# Patient Record
Sex: FEMALE | Race: WHITE | NOT HISPANIC OR LATINO | Employment: OTHER | URBAN - METROPOLITAN AREA
[De-identification: names, ages, dates, MRNs, and addresses within clinical notes are randomized per-mention and may not be internally consistent; named-entity substitution may affect disease eponyms.]

---

## 2017-05-09 RX ORDER — MIRABEGRON 50 MG/1
TABLET, FILM COATED, EXTENDED RELEASE ORAL
Qty: 90 TABLET | Refills: 0 | Status: SHIPPED | OUTPATIENT
Start: 2017-05-09 | End: 2017-06-27 | Stop reason: SDUPTHER

## 2017-06-26 RX ORDER — MIRABEGRON 50 MG/1
TABLET, FILM COATED, EXTENDED RELEASE ORAL
Qty: 90 TABLET | Refills: 3 | OUTPATIENT
Start: 2017-06-26

## 2017-06-27 ENCOUNTER — OFFICE VISIT (OUTPATIENT)
Dept: OBSTETRICS AND GYNECOLOGY | Facility: CLINIC | Age: 67
End: 2017-06-27

## 2017-06-27 VITALS
HEIGHT: 66 IN | DIASTOLIC BLOOD PRESSURE: 74 MMHG | SYSTOLIC BLOOD PRESSURE: 136 MMHG | WEIGHT: 239 LBS | BODY MASS INDEX: 38.41 KG/M2

## 2017-06-27 DIAGNOSIS — Z13.820 SCREENING FOR OSTEOPOROSIS: ICD-10-CM

## 2017-06-27 DIAGNOSIS — Z79.890 POST-MENOPAUSE ON HRT (HORMONE REPLACEMENT THERAPY): Primary | ICD-10-CM

## 2017-06-27 DIAGNOSIS — N32.81 OAB (OVERACTIVE BLADDER): ICD-10-CM

## 2017-06-27 DIAGNOSIS — K46.9 ENTEROCELE: ICD-10-CM

## 2017-06-27 DIAGNOSIS — Z79.890 HORMONE REPLACEMENT THERAPY: ICD-10-CM

## 2017-06-27 PROCEDURE — G0101 CA SCREEN;PELVIC/BREAST EXAM: HCPCS | Performed by: OBSTETRICS & GYNECOLOGY

## 2017-06-27 RX ORDER — SITAGLIPTIN AND METFORMIN HYDROCHLORIDE 1000; 50 MG/1; MG/1
1 TABLET, FILM COATED, EXTENDED RELEASE ORAL 2 TIMES DAILY
COMMUNITY
Start: 2017-04-22 | End: 2020-07-28

## 2017-06-27 RX ORDER — BLOOD-GLUCOSE METER
EACH MISCELLANEOUS
COMMUNITY
Start: 2017-06-23

## 2017-06-27 RX ORDER — ESTRADIOL 0.5 MG/1
0.5 TABLET ORAL EVERY OTHER DAY
Qty: 90 TABLET | Refills: 3 | Status: SHIPPED | OUTPATIENT
Start: 2017-06-27 | End: 2017-08-07 | Stop reason: SDUPTHER

## 2017-06-27 RX ORDER — BLOOD SUGAR DIAGNOSTIC
STRIP MISCELLANEOUS
COMMUNITY
Start: 2017-04-17 | End: 2021-05-11 | Stop reason: SDUPTHER

## 2017-06-27 NOTE — PROGRESS NOTES
"   Chief Complaint   Patient presents with   • Gynecologic Exam   • Med Refill       Yulissa Bauer is a 66 y.o. year old  presenting to be seen for her annual exam.This patient has previously had a robotically-assisted T LH/BSO/VCS/anterior-posterior repair.  This was done in .  She currently takes estradiol, 0.5 mg daily and has relief of menopausal symptoms.  When she attempted to discontinue this medication she developed hot flashes, night sweats, and skin changes.  She desires to continue and has no contraindications.  He has a diagnosis of overactive bladder treated successfully with Mirbegron, 50 mg daily.  She has some dry mouth.    SCREENING TESTS    Year 2012   Age                         PAP   Neg.                      HPV high risk                         Mammogram     benign                    RADHA score                         Breast MRI                         Lipids                         Vitamin D                         Colonoscopy                         DEXA  Frax (hip/any)                         Ovarian Screen                           Enter the month test was performed.  If month not known, enter \"X'  · Black numbers = normal results  · Red numbers = abnormal results  · Black X = patient reported normal  · Red X - patient reported abnormal      Referred by:    Profession:    Other info:        She exercises regularly: no.  She wears her seat belt: yes.  She has concerns about domestic violence: no.  She has noticed changes in height: no    GYN screening history:  · Last mammogram: was done on approximately 2016 and the result was: Birads II (Benign findings)..    No Additional Complaints Reported    The following portions of the patient's history were reviewed and updated as appropriate:vital signs and   She  does not have any pertinent problems on file.  She  has a past " surgical history that includes Parathyroidectomy; Liposuction; Hysteroscopy; Dilation and curettage of uterus; Pelvic laparoscopy; Tubal ligation; Hysterectomy; and Oophorectomy.  Her family history includes Breast cancer in her mother; Heart attack in her father; Stroke in her father.  She  reports that she has never smoked. She has never used smokeless tobacco. She reports that she drinks alcohol. She reports that she does not use illicit drugs.  Current Outpatient Prescriptions   Medication Sig Dispense Refill   • ACCU-CHEK LUÍS PLUS test strip      • acetaminophen (TYLENOL) 650 MG 8 hr tablet Take 650 mg by mouth every 8 (eight) hours as needed for mild pain (1-3).     • amLODIPine (NORVASC) 5 MG tablet   0   • aspirin 81 MG chewable tablet Chew 81 mg daily.     • atenolol (TENORMIN) 25 MG tablet Take 25 mg by mouth daily.     • Blood Glucose Monitoring Suppl (ACCU-CHEK LUÍS PLUS) W/DEVICE kit      • estradiol (ESTRACE) 0.5 MG tablet Take 1 tablet by mouth Every Other Day. 90 tablet 3   • JANUMET XR  MG tablet sustained-release 24 hour Take 1 tablet by mouth 2 (Two) Times a Day.     • meloxicam (MOBIC) 15 MG tablet Take 15 mg by mouth daily.     • Mirabegron ER (MYRBETRIQ) 50 MG tablet sustained-release 24 hour 24 hr tablet Take 50 mg by mouth Daily. 90 tablet 3   • Multiple Vitamins-Minerals (DAILY MULTI VITAMIN/MINERALS PO) Take  by mouth.       No current facility-administered medications for this visit.      She is allergic to aygestin [norethindrone]; cephalosporins; codeine; hydrocodone; penicillins; and prometrium [progesterone micronized]..    Review of Systems  A comprehensive review of systems was taken.  Constitutional: negative for fever, chills, activity change, appetite change, fatigue and unexpected weight change.  Respiratory: negative  Cardiovascular: negative  Gastrointestinal: negative  Genitourinary:negative  Musculoskeletal:negative  Behavioral/Psych: negative       /74  Ht  "65.5\" (166.4 cm)  Wt 239 lb (108 kg)  LMP  (LMP Unknown)  BMI 39.17 kg/m2    Physical Exam    General:  alert; cooperative; well developed; well nourished   Skin:  No suspicious lesions seen   Thyroid: normal to inspection and palpation   Lungs:  clear to auscultation bilaterally   Heart:  regular rate and rhythm, S1, S2 normal, no murmur, click, rub or gallop   Breasts:  Examined in supine position  Symmetric without masses or skin dimpling  Nipples normal without inversion, lesions or discharge  There are no palpable axillary nodes   Abdomen: soft, non-tender; no masses  no umbilical or inginual hernias are present  no hepato-splenomegaly   Pelvis: Clinical staff was present for exam  External genitalia:  normal appearance of the external genitalia including Barth[place]'s and [place]'s glands.  Vaginal:  normal pink mucosa without prolapse or lesions.  Cervix:  absent.  Uterus:  absent.  Adnexa:  absent, bilateral.  Rectal:  anus visually normal appearing. recto-vaginal exam unremarkable and confirms findings;  Enterocele GRADE 1     Lab Review   No data reviewed    Imaging  Mammogram results- benign         ASSESSMENT  Problems Addressed this Visit        Digestive    Enterocele       Musculoskeletal and Integument    OAB (overactive bladder)    Relevant Medications    Mirabegron ER (MYRBETRIQ) 50 MG tablet sustained-release 24 hour 24 hr tablet       Genitourinary    Post-menopause on HRT (hormone replacement therapy) - Primary    Relevant Medications    estradiol (ESTRACE) 0.5 MG tablet      Other Visit Diagnoses     Hormone replacement therapy        Relevant Medications    estradiol (ESTRACE) 0.5 MG tablet          PLAN    Medications prescribed this encounter:    New Medications Ordered This Visit   Medications   • JANUMET XR  MG tablet sustained-release 24 hour     Sig: Take 1 tablet by mouth 2 (Two) Times a Day.   • Blood Glucose Monitoring Suppl (ACCU-CHEK LUÍS PLUS) W/DEVICE kit   • ACCU-CHEK " LUÍS PLUS test strip   • estradiol (ESTRACE) 0.5 MG tablet     Sig: Take 1 tablet by mouth Every Other Day.     Dispense:  90 tablet     Refill:  3   • Mirabegron ER (MYRBETRIQ) 50 MG tablet sustained-release 24 hour 24 hr tablet     Sig: Take 50 mg by mouth Daily.     Dispense:  90 tablet     Refill:  3   · DEXA ordered  · Cautioned to avoid heavy lifting and straining with bowel movements  · Calcium, 600 mg/ Vit. D, 400 IU daily; regular weight-bearing exercise  · Follow up: 12 month(s)  *Please note that portions of this documentation may have been completed with a voice recognition program.  Efforts were made to edit this dictation, but occasional words may have been mistranscribed.       This note was electronically signed.    FILIPE Narayan MD  June 27, 2017  9:41 AM

## 2017-07-07 DIAGNOSIS — Z79.890 HORMONE REPLACEMENT THERAPY: ICD-10-CM

## 2017-07-07 RX ORDER — ESTRADIOL 0.5 MG/1
TABLET ORAL
Qty: 36 TABLET | Refills: 3 | OUTPATIENT
Start: 2017-07-07

## 2017-07-07 RX ORDER — MIRABEGRON 50 MG/1
TABLET, FILM COATED, EXTENDED RELEASE ORAL
Qty: 90 TABLET | Refills: 0 | OUTPATIENT
Start: 2017-07-07

## 2017-07-18 ENCOUNTER — HOSPITAL ENCOUNTER (OUTPATIENT)
Dept: BONE DENSITY | Facility: HOSPITAL | Age: 67
Discharge: HOME | End: 2017-07-18
Attending: OBSTETRICS & GYNECOLOGY | Admitting: OBSTETRICS & GYNECOLOGY

## 2017-07-18 DIAGNOSIS — Z13.820 SCREENING FOR OSTEOPOROSIS: ICD-10-CM

## 2017-07-18 PROCEDURE — 77080 DXA BONE DENSITY AXIAL: CPT

## 2017-07-18 PROCEDURE — 77080 DXA BONE DENSITY AXIAL: CPT | Performed by: RADIOLOGY

## 2017-08-06 DIAGNOSIS — Z79.890 HORMONE REPLACEMENT THERAPY: ICD-10-CM

## 2017-08-07 RX ORDER — ESTRADIOL 0.5 MG/1
TABLET ORAL
Qty: 36 TABLET | Refills: 3 | Status: SHIPPED | OUTPATIENT
Start: 2017-08-07 | End: 2018-08-31 | Stop reason: SDUPTHER

## 2017-08-07 RX ORDER — MIRABEGRON 50 MG/1
TABLET, FILM COATED, EXTENDED RELEASE ORAL
Qty: 90 TABLET | Refills: 0 | Status: SHIPPED | OUTPATIENT
Start: 2017-08-07 | End: 2017-12-04 | Stop reason: SDUPTHER

## 2017-10-12 ENCOUNTER — TRANSCRIBE ORDERS (OUTPATIENT)
Dept: ADMINISTRATIVE | Facility: HOSPITAL | Age: 67
End: 2017-10-12

## 2017-10-12 DIAGNOSIS — Z12.31 VISIT FOR SCREENING MAMMOGRAM: Primary | ICD-10-CM

## 2017-11-14 ENCOUNTER — HOSPITAL ENCOUNTER (OUTPATIENT)
Dept: MAMMOGRAPHY | Facility: HOSPITAL | Age: 67
Discharge: HOME | End: 2017-11-14
Attending: OBSTETRICS & GYNECOLOGY | Admitting: OBSTETRICS & GYNECOLOGY

## 2017-11-14 DIAGNOSIS — Z12.31 VISIT FOR SCREENING MAMMOGRAM: ICD-10-CM

## 2017-11-14 PROCEDURE — G0202 SCR MAMMO BI INCL CAD: HCPCS

## 2017-11-14 PROCEDURE — G0202 SCR MAMMO BI INCL CAD: HCPCS | Performed by: RADIOLOGY

## 2017-11-14 PROCEDURE — 77063 BREAST TOMOSYNTHESIS BI: CPT | Performed by: RADIOLOGY

## 2017-11-14 PROCEDURE — 77063 BREAST TOMOSYNTHESIS BI: CPT

## 2017-12-04 RX ORDER — MIRABEGRON 50 MG/1
TABLET, FILM COATED, EXTENDED RELEASE ORAL
Qty: 90 TABLET | Refills: 2 | Status: SHIPPED | OUTPATIENT
Start: 2017-12-04 | End: 2017-12-26 | Stop reason: SDUPTHER

## 2017-12-26 RX ORDER — MIRABEGRON 50 MG/1
TABLET, FILM COATED, EXTENDED RELEASE ORAL
Qty: 90 TABLET | Refills: 3 | Status: SHIPPED | OUTPATIENT
Start: 2017-12-26 | End: 2018-07-10 | Stop reason: SDUPTHER

## 2018-01-09 ENCOUNTER — OFFICE VISIT (OUTPATIENT)
Dept: NEUROSURGERY | Facility: CLINIC | Age: 68
End: 2018-01-09

## 2018-01-09 VITALS
HEIGHT: 66 IN | TEMPERATURE: 97.3 F | DIASTOLIC BLOOD PRESSURE: 90 MMHG | SYSTOLIC BLOOD PRESSURE: 135 MMHG | WEIGHT: 240.2 LBS | BODY MASS INDEX: 38.6 KG/M2

## 2018-01-09 DIAGNOSIS — G50.0 TRIGEMINAL NEURALGIA: Primary | ICD-10-CM

## 2018-01-09 PROCEDURE — 99203 OFFICE O/P NEW LOW 30 MIN: CPT | Performed by: NEUROLOGICAL SURGERY

## 2018-01-09 NOTE — PROGRESS NOTES
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  She is had this for approximately 2-3 months.  2 diabetes but no evidence of neuralgia otherwise.  She has is unassociated with headache or any other neurological manifestation.  Her health has been excellent.. ]    Past Medical History:   Diagnosis Date   • Carcinoma in situ of skin of chest wall    • Diabetes mellitus     TYPE 2   • Hypertension    • Inverse psoriasis    • Menopause    • Rectocele    • Urine incontinence    • Uterine prolapse        Past Surgical History:   Procedure Laterality Date   • DILATATION AND CURETTAGE     • HYSTERECTOMY     • HYSTEROSCOPY      D&C X2   • LIPOSUCTION     • OOPHORECTOMY     • PARATHYROIDECTOMY     • PELVIC LAPAROSCOPY     • TUBAL ABDOMINAL LIGATION         Family History   Problem Relation Age of Onset   • Stroke Father    • Heart attack Father    • Breast cancer Mother 55   • Ovarian cancer Neg Hx        Social History     Social History   • Marital status:      Spouse name: N/A   • Number of children: N/A   • Years of education: N/A     Occupational History   • Not on file.     Social History Main Topics   • Smoking status: Never Smoker   • Smokeless tobacco: Never Used   • Alcohol use Yes      Comment: RARELY   • Drug use: No   • Sexual activity: No     Other Topics Concern   • Not on file     Social History Narrative       Allergies   Allergen Reactions   • Aygestin [Norethindrone]    • Cephalosporins    • Codeine    • Hydrocodone    • Penicillins    • Prometrium [Progesterone Micronized]          Current Outpatient Prescriptions:   •  ACCU-CHEK LUÍS PLUS test strip, , Disp: , Rfl:   •  acetaminophen  (TYLENOL) 650 MG 8 hr tablet, Take 650 mg by mouth every 8 (eight) hours as needed for mild pain (1-3)., Disp: , Rfl:   •  amLODIPine (NORVASC) 5 MG tablet, , Disp: , Rfl: 0  •  aspirin 81 MG chewable tablet, Chew 81 mg daily., Disp: , Rfl:   •  atenolol (TENORMIN) 25 MG tablet, Take 25 mg by mouth daily., Disp: , Rfl:   •  Blood Glucose Monitoring Suppl (ACCU-CHEK LUÍS PLUS) W/DEVICE kit, , Disp: , Rfl:   •  estradiol (ESTRACE) 0.5 MG tablet, take 1 tablet by mouth every other day, Disp: 36 tablet, Rfl: 3  •  JANUMET XR  MG tablet sustained-release 24 hour, Take 1 tablet by mouth 2 (Two) Times a Day., Disp: , Rfl:   •  meloxicam (MOBIC) 15 MG tablet, Take 15 mg by mouth daily., Disp: , Rfl:   •  Multiple Vitamins-Minerals (DAILY MULTI VITAMIN/MINERALS PO), Take  by mouth., Disp: , Rfl:   •  MYRBETRIQ 50 MG tablet sustained-release 24 hour 24 hr tablet, TAKE 1 TABLET (50MG) EVERY DAY, Disp: 90 tablet, Rfl: 3    Review of Systems   Constitutional: Negative for activity change, appetite change, chills, diaphoresis, fatigue, fever and unexpected weight change.   HENT: Positive for drooling. Negative for congestion, dental problem, ear discharge, ear pain, facial swelling, hearing loss, mouth sores, nosebleeds, postnasal drip, rhinorrhea, sinus pressure, sneezing, sore throat, tinnitus, trouble swallowing and voice change.    Eyes: Negative for photophobia, pain, discharge, redness, itching and visual disturbance.   Respiratory: Negative for apnea, cough, choking, chest tightness, shortness of breath, wheezing and stridor.    Cardiovascular: Negative for chest pain, palpitations and leg swelling.   Gastrointestinal: Negative for abdominal distention, abdominal pain, anal bleeding, blood in stool, constipation, diarrhea, nausea, rectal pain and vomiting.   Endocrine: Negative for cold intolerance, heat intolerance, polydipsia, polyphagia and polyuria.   Genitourinary: Negative for decreased urine volume,  "difficulty urinating, dysuria, enuresis, flank pain, frequency, genital sores, hematuria and urgency.   Musculoskeletal: Negative for arthralgias, back pain, gait problem, joint swelling, myalgias, neck pain and neck stiffness.   Skin: Negative for color change, pallor, rash and wound.   Allergic/Immunologic: Negative for environmental allergies, food allergies and immunocompromised state.   Neurological: Positive for numbness. Negative for dizziness, tremors, seizures, syncope, facial asymmetry, speech difficulty, weakness, light-headedness and headaches.   Hematological: Negative for adenopathy. Does not bruise/bleed easily.   Psychiatric/Behavioral: Negative for agitation, behavioral problems, confusion, decreased concentration, dysphoric mood, hallucinations, self-injury, sleep disturbance and suicidal ideas. The patient is not nervous/anxious and is not hyperactive.        Vitals:    01/09/18 1323   BP: 135/90   Temp: 97.3 °F (36.3 °C)   Weight: 109 kg (240 lb 3.2 oz)   Height: 166.4 cm (65.51\")       Neurological Examination:    Mental status/speech: The patient is alert and oriented.  Speech is clear without aphysia or dysarthria.  No overt cognitive deficits.    Cranial nerve examination:    Olfaction: Smell is intact.  Vision: Vision is intact without visual field abnormalities.  Funduscopic examination is normal.  No pupillary irregularity.  Ocular motor examination: The extraocular muscles are intact.  There is no diplopia.  The pupil is round and reactive to both light and accommodation.  There is no nystagmus.  Facial movement/sensation: There is no facial weakness.  Sensation is intact in the first, second, and third divisions of the trigeminal nerve.  The corneal reflex is intact.  Auditory: Hearing is intact to finger rub bilaterally.  Cranial nerves IX, X, XI, XII: Phonation is normal.  No dysphagia.  Tongue is protruded in the midline without atrophy.  The gag reflex is intact.  Shoulder shrug is " normal.    Musculoligamentous ligamentous examination: There is no weakness sensory loss or reflex asymmetry.  Gait is normal.  No Babinski Ammy or clonus.    Medical Decision Making:     Diagnostic Data Set:  [  none   ]      Assessment:  [  Trigeminal neuralgia ]          Recommendations:  [ I have ordered a MRI of the brain and was see her subsequently.   ]        I greatly appreciate the opportunity to see and evaluate this individual.  If you have questions or concerns regarding issues that I may have overlooked please call me at any time: [phone].  Randy Giron M.D.  [facility]  [address] [place] [address]    Scribed for Scott Giron MD by Dustin Powell CMA. 1/9/2018  1:49 PM     I have read and concur with the information provided by the scribe.  Scott Giron MD

## 2018-01-16 ENCOUNTER — HOSPITAL ENCOUNTER (OUTPATIENT)
Dept: MRI IMAGING | Facility: HOSPITAL | Age: 68
Discharge: HOME | End: 2018-01-16
Attending: NEUROLOGICAL SURGERY | Admitting: NEUROLOGICAL SURGERY

## 2018-01-16 ENCOUNTER — OFFICE VISIT (OUTPATIENT)
Dept: NEUROSURGERY | Facility: CLINIC | Age: 68
End: 2018-01-16

## 2018-01-16 VITALS
BODY MASS INDEX: 38.83 KG/M2 | HEIGHT: 66 IN | WEIGHT: 241.6 LBS | DIASTOLIC BLOOD PRESSURE: 70 MMHG | SYSTOLIC BLOOD PRESSURE: 110 MMHG | TEMPERATURE: 97.5 F

## 2018-01-16 DIAGNOSIS — R20.0 FACIAL NUMBNESS: Primary | ICD-10-CM

## 2018-01-16 PROCEDURE — A9577 INJ MULTIHANCE: HCPCS | Performed by: NEUROLOGICAL SURGERY

## 2018-01-16 PROCEDURE — 82565 ASSAY OF CREATININE: CPT

## 2018-01-16 PROCEDURE — 0 GADOBENATE DIMEGLUMINE 529 MG/ML SOLUTION: Performed by: NEUROLOGICAL SURGERY

## 2018-01-16 PROCEDURE — 70553 MRI BRAIN STEM W/O & W/DYE: CPT

## 2018-01-16 PROCEDURE — 99213 OFFICE O/P EST LOW 20 MIN: CPT | Performed by: NEUROLOGICAL SURGERY

## 2018-01-16 RX ADMIN — GADOBENATE DIMEGLUMINE 20 ML: 529 INJECTION, SOLUTION INTRAVENOUS at 08:35

## 2018-01-16 NOTE — PROGRESS NOTES
Yulissa Bauer  1950  [id number]                       CURRENT WORKING DIAGNOSIS:   Trigeminal neuralgia         MEDICAL HISTORY SINCE LAST ENCOUNTER:   Reports for evaluation and discussion of MRI of the brain which is essentially within normal limits.  There are a few hypertensive changes in the right hemisphere but certainly no large plaques that would suggest multiple sclerosis or evidence of ischemia.  There is no evidence of neoplasia.           Past Medical History:   Diagnosis Date   • Carcinoma in situ of skin of chest wall    • Diabetes mellitus     TYPE 2   • Hypertension    • Inverse psoriasis    • Menopause    • Rectocele    • Urine incontinence    • Uterine prolapse               Past Surgical History:   Procedure Laterality Date   • DILATATION AND CURETTAGE     • HYSTERECTOMY     • HYSTEROSCOPY      D&C X2   • LIPOSUCTION     • OOPHORECTOMY     • PARATHYROIDECTOMY     • PELVIC LAPAROSCOPY     • TUBAL ABDOMINAL LIGATION                Family History   Problem Relation Age of Onset   • Stroke Father    • Heart attack Father    • Breast cancer Mother 55   • Ovarian cancer Neg Hx               Social History     Social History   • Marital status:      Spouse name: N/A   • Number of children: N/A   • Years of education: N/A     Occupational History   • Not on file.     Social History Main Topics   • Smoking status: Never Smoker   • Smokeless tobacco: Never Used   • Alcohol use Yes      Comment: RARELY   • Drug use: No   • Sexual activity: No     Other Topics Concern   • Not on file     Social History Narrative              Allergies   Allergen Reactions   • Aygestin [Norethindrone]    • Cephalosporins    • Codeine    • Hydrocodone    • Penicillins    • Prometrium [Progesterone Micronized]               Current Outpatient Prescriptions:   •  ACCU-CHEK LUÍS PLUS test strip, , Disp: , Rfl:   •  acetaminophen (TYLENOL) 650 MG 8 hr tablet, Take 650 mg by mouth every 8 (eight) hours as needed for  mild pain (1-3)., Disp: , Rfl:   •  amLODIPine (NORVASC) 5 MG tablet, , Disp: , Rfl: 0  •  aspirin 81 MG chewable tablet, Chew 81 mg daily., Disp: , Rfl:   •  atenolol (TENORMIN) 25 MG tablet, Take 25 mg by mouth daily., Disp: , Rfl:   •  Blood Glucose Monitoring Suppl (ACCU-CHEK LUÍS PLUS) W/DEVICE kit, , Disp: , Rfl:   •  estradiol (ESTRACE) 0.5 MG tablet, take 1 tablet by mouth every other day, Disp: 36 tablet, Rfl: 3  •  JANUMET XR  MG tablet sustained-release 24 hour, Take 1 tablet by mouth 2 (Two) Times a Day., Disp: , Rfl:   •  meloxicam (MOBIC) 15 MG tablet, Take 15 mg by mouth daily., Disp: , Rfl:   •  Multiple Vitamins-Minerals (DAILY MULTI VITAMIN/MINERALS PO), Take  by mouth., Disp: , Rfl:   •  MYRBETRIQ 50 MG tablet sustained-release 24 hour 24 hr tablet, TAKE 1 TABLET (50MG) EVERY DAY, Disp: 90 tablet, Rfl: 3  No current facility-administered medications for this visit.          Review of Systems   Constitutional: Negative for activity change, appetite change, chills, diaphoresis, fatigue, fever and unexpected weight change.   HENT: Positive for drooling. Negative for congestion, dental problem, ear discharge, ear pain, facial swelling, hearing loss, mouth sores, nosebleeds, postnasal drip, rhinorrhea, sinus pressure, sneezing, sore throat, tinnitus, trouble swallowing and voice change.    Eyes: Negative for photophobia, pain, discharge, redness, itching and visual disturbance.   Respiratory: Negative for apnea, cough, choking, chest tightness, shortness of breath, wheezing and stridor.    Cardiovascular: Negative for chest pain, palpitations and leg swelling.   Gastrointestinal: Negative for abdominal distention, abdominal pain, anal bleeding, blood in stool, constipation, diarrhea, nausea, rectal pain and vomiting.   Endocrine: Negative for cold intolerance, heat intolerance, polydipsia, polyphagia and polyuria.   Genitourinary: Negative for decreased urine volume, difficulty urinating,  "dysuria, enuresis, flank pain, frequency, genital sores, hematuria and urgency.   Musculoskeletal: Negative for arthralgias, back pain, gait problem, joint swelling, myalgias, neck pain and neck stiffness.   Skin: Negative for color change, pallor, rash and wound.   Allergic/Immunologic: Negative for environmental allergies, food allergies and immunocompromised state.   Neurological: Positive for numbness. Negative for dizziness, tremors, seizures, syncope, facial asymmetry, speech difficulty, weakness, light-headedness and headaches.   Hematological: Negative for adenopathy. Does not bruise/bleed easily.   Psychiatric/Behavioral: Negative for agitation, behavioral problems, confusion, decreased concentration, dysphoric mood, hallucinations, self-injury, sleep disturbance and suicidal ideas. The patient is not nervous/anxious and is not hyperactive.                Vitals:    01/16/18 0916   BP: 110/70   Temp: 97.5 °F (36.4 °C)   Weight: 110 kg (241 lb 9.6 oz)   Height: 166.4 cm (65.51\")               EXAMINATION: [Without change. ]            MEDICAL DECISION MAKING: [ Her clinical diagnosis is that of neurogenic claudication.  The etiology for this is somewhat idiopathic and none noted to's time.]           ASSESSMENT/DISPOSITION: [ I've explained to her that although she has trigeminal neuralgia there is no precise diagnosis forthcoming.  For that reason I told her to continue to monitor her symptoms and if they worsen or change I would be more than happy to reevaluate her any time.  If her symptoms worsen and these can be managed medically prior to any consideration of microvascular decompression.  Thank you for allowing me to see her]              I APPRECIATE THE OPPORTUNITY OF THIS REFERRAL. PLEASE CALL IF ANY QUESTIONS [phone]    Scribed for Scott Giron MD by Dustin Powell CMA. 1/16/2018  9:30 AM     I have read and concur with the information provided by the scribe.  Scott Giron MD    "

## 2018-01-19 LAB — CREAT BLDA-MCNC: 0.7 MG/DL (ref 0.6–1.3)

## 2018-07-10 ENCOUNTER — OFFICE VISIT (OUTPATIENT)
Dept: OBSTETRICS AND GYNECOLOGY | Facility: CLINIC | Age: 68
End: 2018-07-10

## 2018-07-10 VITALS
HEIGHT: 65 IN | DIASTOLIC BLOOD PRESSURE: 80 MMHG | WEIGHT: 231.2 LBS | BODY MASS INDEX: 38.52 KG/M2 | SYSTOLIC BLOOD PRESSURE: 120 MMHG

## 2018-07-10 DIAGNOSIS — K46.9 ENTEROCELE: ICD-10-CM

## 2018-07-10 DIAGNOSIS — N81.6 RECTOCELE: ICD-10-CM

## 2018-07-10 DIAGNOSIS — Z79.890 POST-MENOPAUSE ON HRT (HORMONE REPLACEMENT THERAPY): Primary | ICD-10-CM

## 2018-07-10 DIAGNOSIS — Z01.419 ENCOUNTER FOR GYNECOLOGICAL EXAMINATION WITHOUT ABNORMAL FINDING: ICD-10-CM

## 2018-07-10 DIAGNOSIS — N32.81 OAB (OVERACTIVE BLADDER): ICD-10-CM

## 2018-07-10 DIAGNOSIS — N81.5 VAGINAL ENTEROCELE: Primary | ICD-10-CM

## 2018-07-10 PROCEDURE — G0101 CA SCREEN;PELVIC/BREAST EXAM: HCPCS | Performed by: OBSTETRICS & GYNECOLOGY

## 2018-07-10 NOTE — PROGRESS NOTES
Chief Complaint   Patient presents with   • Gynecologic Exam     ? rectocele   • Med Refill       Yulissa Bauer is a 67 y.o. year old  presenting to be seen for her annual exam.This patient has previously had a robotically-assisted TLH/BSO/VCS.  She has developed complaints of vaginal pressure and bulging from the vagina.  She has intermittent constipation treated with Colace.  She has symptoms of overactive bladder successfully treated with Myrbetriq, 50 mg daily.  She takes estradiol, 0.5 mg by mouth 3 times a week to relieve dry skin.  She has no side effects on these medications.  She desires evaluation of a potential rectocele/enterocele.    SCREENING TESTS    Year 2012   Age                         PAP                         HPV high risk                         Mammogram     benign benign                   RADHA score                         Breast MRI                         Lipids                         Vitamin D                         Colonoscopy                         DEXA  Frax (hip/any)      Normal                   Ovarian Screen                             She exercises regularly: no.  She wears her seat belt: yes.  She has concerns about domestic violence: no.  She has noticed changes in height: no    GYN screening history:  · Last mammogram: was done on approximately 2017 and the result was: Birads I (Normal).  · Last DEXA: was done on approximately 2017 and the results were: normal.    No Additional Complaints Reported    The following portions of the patient's history were reviewed and updated as appropriate:vital signs and   She  has a past medical history of Carcinoma in situ of skin of chest wall; Diabetes mellitus (CMS/HCC); Hypertension; Inverse psoriasis; Menopause; Rectocele; Urine incontinence; and Uterine prolapse.  She  does not have any pertinent problems on  file.  She  has a past surgical history that includes Parathyroidectomy; Liposuction; Hysteroscopy; Dilation and curettage of uterus; Pelvic laparoscopy; Tubal ligation; total laparoscopic hysterectomy salpingo oophorectomy (Bilateral); and Colporrhaphy.  Her family history includes Breast cancer (age of onset: 55) in her mother; Heart attack in her father; Stroke in her father.  She  reports that she has never smoked. She has never used smokeless tobacco. She reports that she drinks alcohol. She reports that she does not use drugs.  Current Outpatient Prescriptions   Medication Sig Dispense Refill   • ACCU-CHEK LUÍS PLUS test strip      • acetaminophen (TYLENOL) 650 MG 8 hr tablet Take 650 mg by mouth every 8 (eight) hours as needed for mild pain (1-3).     • amLODIPine (NORVASC) 5 MG tablet   0   • aspirin 81 MG chewable tablet Chew 81 mg daily.     • atenolol (TENORMIN) 25 MG tablet Take 25 mg by mouth daily.     • Blood Glucose Monitoring Suppl (ACCU-CHEK LUÍS PLUS) W/DEVICE kit      • estradiol (ESTRACE) 0.5 MG tablet take 1 tablet by mouth every other day 36 tablet 3   • JANUMET XR  MG tablet sustained-release 24 hour Take 1 tablet by mouth 2 (Two) Times a Day.     • meloxicam (MOBIC) 15 MG tablet Take 15 mg by mouth daily.     • Mirabegron ER (MYRBETRIQ) 50 MG tablet sustained-release 24 hour 24 hr tablet Take one tablet by mouth daily 90 tablet 3   • Multiple Vitamins-Minerals (DAILY MULTI VITAMIN/MINERALS PO) Take  by mouth.       No current facility-administered medications for this visit.      She is allergic to aygestin [norethindrone]; cephalosporins; codeine; penicillins; prometrium [progesterone micronized]; cephalexin; ciprofloxacin; hydrocodone; and nitrofurantoin macrocrystal..    Review of Systems  A comprehensive review of systems was taken.  Constitutional: negative for fever, chills, activity change, appetite change, fatigue and unexpected weight change.  Respiratory:  "negative  Cardiovascular: negative  Gastrointestinal: negative  Genitourinary:vaginal pressure  Musculoskeletal:negative  Behavioral/Psych: negative       /80   Ht 165.1 cm (65\")   Wt 105 kg (231 lb 3.2 oz)   LMP  (LMP Unknown)   BMI 38.47 kg/m²     Physical Exam    General:  alert; cooperative; well developed; well nourished  obese - Body mass index is 38.47 kg/m².   Skin:  No suspicious lesions seen   Thyroid: normal to inspection and palpation   Lungs:  clear to auscultation bilaterally   Heart:  regular rate and rhythm, S1, S2 normal, no murmur, click, rub or gallop   Breasts:  Examined in supine position  Symmetric without masses or skin dimpling  Nipples normal without inversion, lesions or discharge  There are no palpable axillary nodes   Abdomen: soft, non-tender; no masses  no umbilical or inginual hernias are present  no hepato-splenomegaly   Pelvis: Clinical staff was present for exam  External genitalia:  normal appearance of the external genitalia including Bartholin's and [place]'s glands.  Vaginal:  normal pink mucosa without prolapse or lesions.  Cervix:  absent.  Uterus:  absent.  Adnexa:  absent, bilateral.  Rectal:  anus visually normal appearing. recto-vaginal exam unremarkable and confirms findings;  Rectocele GRADE 2  Enterocele GRADE 2     Lab Review   No data reviewed    Imaging  Mammogram results- benign, and DEXA- normal         ASSESSMENT  Problems Addressed this Visit        Digestive    Enterocele    Rectocele       Musculoskeletal and Integument    OAB (overactive bladder)    Relevant Medications    Mirabegron ER (MYRBETRIQ) 50 MG tablet sustained-release 24 hour 24 hr tablet       Genitourinary    Post-menopause on HRT (hormone replacement therapy) - Primary      Other Visit Diagnoses     Encounter for gynecological examination without abnormal finding              PLAN    Medications prescribed this encounter:    New Medications Ordered This Visit   Medications   • Mirabegron " ER (MYRBETRIQ) 50 MG tablet sustained-release 24 hour 24 hr tablet     Sig: Take one tablet by mouth daily     Dispense:  90 tablet     Refill:  3   · Monthly self breast assessment and annual breast imaging  · Continue oral estradiol, 0.5 mg 3 times a week  · The patient has been given information about posterior repair/enterocele repair and perineoplasty.  The surgical risks of bowel injury, rectal injury, bleeding, infection, and anesthetic risks were explained.  · Calcium, 600 mg/ Vit. D, 400 IU daily; regular weight-bearing exercise  · Follow up: 5 month(s) for surgery  *Please note that portions of this documentation may have been completed with a voice recognition program.  Efforts were made to edit this dictation, but occasional words may have been mistranscribed.       This note was electronically signed.    FILIPE Narayan MD  July 10, 2018  9:41 AM

## 2018-07-10 NOTE — PATIENT INSTRUCTIONS
Anterior and Posterior Colporrhaphy, Care After  Refer to this sheet in the next few weeks. These instructions provide you with information on caring for yourself after your procedure. Your health care provider may also give you more specific instructions. Your treatment has been planned according to current medical practices, but problems sometimes occur. Call your health care provider if you have any problems or questions after your procedure.  Follow these instructions at home:  · Take frequent rest periods throughout the day.  · Only take over-the-counter or prescription medicines as directed by your health care provider.  · Avoid strenuous activity such as heavy lifting (more than 10 pounds [4.5 kg]), pushing, and pulling until your health care provider says it is okay.  · Take showers if your health care provider approves. Pat incisions dry. Do not rub incisions with a washcloth or towel. Do not take tub baths until your health care provider approves.  · Wear compression stockings as directed by your health care provider. These stockings help prevent blood clots from forming in your legs.  · Talk with your health care provider about when you may return to work and your exercise routine.  · Do not drive until your health care provider approves.  · You may resume your normal diet. Eat a well-balanced diet.  · Drink enough fluids to keep your urine clear or pale yellow.  · Your normal bowel function should return. If you become constipated, you may:  ? Take a mild laxative.  ? Add fruit and bran to your diet.  ? Drink more fluids.  · Do not have sexual intercourse until permitted by your health care provider.  · Follow up with your health care provider as directed.  Contact a health care provider if:  You have persistent nausea or vomiting.  Get help right away if:  · You have increased bleeding (more than a small spot) from the vaginal area.  · Your pain is not relieved with medicine or becomes worse.  · You  have redness, swelling, or increasing pain in the vaginal area.  · You have abdominal pain.  · You see pus coming from the wounds.  · You develop a fever.  · You have a foul smell coming from your vaginal area.  · You develop light-headedness or you feel faint.  · You have difficulty breathing.  This information is not intended to replace advice given to you by your health care provider. Make sure you discuss any questions you have with your health care provider.  Document Released: 07/06/2006 Document Revised: 05/25/2017 Document Reviewed: 05/09/2014  Elsevier Interactive Patient Education © 2017 [employer]

## 2018-08-31 DIAGNOSIS — Z79.890 HORMONE REPLACEMENT THERAPY: ICD-10-CM

## 2018-09-04 RX ORDER — ESTRADIOL 0.5 MG/1
TABLET ORAL
Qty: 36 TABLET | Refills: 1 | Status: SHIPPED | OUTPATIENT
Start: 2018-09-04 | End: 2020-07-28 | Stop reason: SDUPTHER

## 2018-10-22 ENCOUNTER — TELEPHONE (OUTPATIENT)
Dept: OBSTETRICS AND GYNECOLOGY | Facility: CLINIC | Age: 68
End: 2018-10-22

## 2018-10-22 NOTE — TELEPHONE ENCOUNTER
Nette calls today to discuss potentially cancelling her surgery scheduled for December 14.  She has a grade 2 rectocele and a grade 2 enterocele, and is scheduled for a posterior repair/enterocele repair/perineoplasty.  She states that she has a problem with chronic constipation, and has made dietary changes and has been taking stool softeners and an occasional laxative.  She says that due to these changes, her constipation has been resolved and she feels that the bulging has improved.    I advised her to leave her surgery on the schedule at this time and to see how the next few weeks go.  This is an elective procedure and she may choose to call and cancel, but was advised that if she cancelled and then changed her mind, the surgery date would likely be sometime after the first of the year.  She voiced understanding and will wait 2 weeks before making a final decision.

## 2018-10-29 ENCOUNTER — TELEPHONE (OUTPATIENT)
Dept: OBSTETRICS AND GYNECOLOGY | Facility: CLINIC | Age: 68
End: 2018-10-29

## 2018-10-29 NOTE — TELEPHONE ENCOUNTER
Pt calling to say she has decided to cancel her Posterior repair scheduled 12/14. Says she has made changes to her diet and feels the problem with constipation is much improved and she is having much less pressure and feeling of rectal bulging. She understands that she may call back to re-schedule but we can not guarantee availability of the date.

## 2019-01-02 ENCOUNTER — TRANSCRIBE ORDERS (OUTPATIENT)
Dept: ADMINISTRATIVE | Facility: HOSPITAL | Age: 69
End: 2019-01-02

## 2019-01-02 DIAGNOSIS — Z12.31 VISIT FOR SCREENING MAMMOGRAM: Primary | ICD-10-CM

## 2019-02-12 ENCOUNTER — HOSPITAL ENCOUNTER (OUTPATIENT)
Dept: MAMMOGRAPHY | Facility: HOSPITAL | Age: 69
Discharge: HOME | End: 2019-02-12
Attending: OBSTETRICS & GYNECOLOGY | Admitting: OBSTETRICS & GYNECOLOGY

## 2019-02-12 DIAGNOSIS — Z12.31 VISIT FOR SCREENING MAMMOGRAM: ICD-10-CM

## 2019-02-12 PROCEDURE — 77067 SCR MAMMO BI INCL CAD: CPT

## 2019-02-12 PROCEDURE — 77067 SCR MAMMO BI INCL CAD: CPT | Performed by: RADIOLOGY

## 2019-02-12 PROCEDURE — 77063 BREAST TOMOSYNTHESIS BI: CPT | Performed by: RADIOLOGY

## 2019-02-12 PROCEDURE — 77063 BREAST TOMOSYNTHESIS BI: CPT

## 2019-07-23 ENCOUNTER — OFFICE VISIT (OUTPATIENT)
Dept: OBSTETRICS AND GYNECOLOGY | Facility: CLINIC | Age: 69
End: 2019-07-23

## 2019-07-23 VITALS
HEIGHT: 65 IN | DIASTOLIC BLOOD PRESSURE: 76 MMHG | WEIGHT: 227.2 LBS | SYSTOLIC BLOOD PRESSURE: 140 MMHG | BODY MASS INDEX: 37.85 KG/M2

## 2019-07-23 DIAGNOSIS — Z01.419 ENCOUNTER FOR GYNECOLOGICAL EXAMINATION WITHOUT ABNORMAL FINDING: ICD-10-CM

## 2019-07-23 DIAGNOSIS — N81.6 RECTOCELE: ICD-10-CM

## 2019-07-23 DIAGNOSIS — Z79.890 POST-MENOPAUSE ON HRT (HORMONE REPLACEMENT THERAPY): Primary | ICD-10-CM

## 2019-07-23 DIAGNOSIS — K46.9 ENTEROCELE: ICD-10-CM

## 2019-07-23 DIAGNOSIS — N32.81 OAB (OVERACTIVE BLADDER): ICD-10-CM

## 2019-07-23 PROCEDURE — G0101 CA SCREEN;PELVIC/BREAST EXAM: HCPCS | Performed by: OBSTETRICS & GYNECOLOGY

## 2019-07-23 RX ORDER — GABAPENTIN 100 MG/1
300 CAPSULE ORAL AS NEEDED
Refills: 2 | COMMUNITY
Start: 2019-06-19 | End: 2022-03-01

## 2019-07-23 RX ORDER — EMPAGLIFLOZIN 25 MG/1
1 TABLET, FILM COATED ORAL DAILY
COMMUNITY
Start: 2019-05-08 | End: 2021-05-11

## 2019-07-23 NOTE — PROGRESS NOTES
Chief Complaint   Patient presents with   • Gynecologic Exam   • Med Refill   • Urinary Incontinence       Yulissa Bauer is a 68 y.o. year old  presenting to be seen for her annual exam.  Nicholas has previously had tubal sterilization and has had a robotically-assisted TLH/BSO/VCS.  She has complaints of overactive bladder with frequency and urgency.  She has had a partial response to Myrbetriq, 50 mg each evening.  She desires an increase in dosage of possible?  She takes a very small dose of estradiol, 0.25 mg 3 times a week.  She has no side effects on this medication.  She denies bowel symptoms.    SCREENING TESTS    Year 2012   Age                         PAP                         HPV high risk                         Mammogram       benign benign                 RADHA score                         Breast MRI                         Lipids                         Vitamin D                         Colonoscopy                         DEXA  Frax (hip/any)      normal                   Ovarian Screen                             She exercises regularly: yes.  She wears her seat belt: yes.  She has concerns about domestic violence: no.  She has noticed changes in height: no    GYN screening history:  · Last mammogram: was done on approximately 2019 and the result was: Birads II (Benign findings).  · Last DEXA: was done on approximately 2017 and the results were: normal.    No Additional Complaints Reported    The following portions of the patient's history were reviewed and updated as appropriate:vital signs and   She  has a past medical history of Carcinoma in situ of skin of chest wall, Diabetes mellitus (CMS/HCC), Hypertension, Inverse psoriasis, Menopause, Rectocele, Sciatica, Urine incontinence, and Uterine prolapse.  She does not have any pertinent problems on file.  She  has a past  surgical history that includes Parathyroidectomy; Liposuction; Hysteroscopy; Dilation and curettage of uterus; Pelvic laparoscopy; Tubal ligation; total laparoscopic hysterectomy salpingo oophorectomy (Bilateral); Colporrhaphy; Oophorectomy; and Breast cyst aspiration.  Her family history includes Breast cancer (age of onset: 55) in her mother; Heart attack in her father; Stroke in her father.  She  reports that she has never smoked. She has never used smokeless tobacco. She reports that she drinks alcohol. She reports that she does not use drugs.  Current Outpatient Medications   Medication Sig Dispense Refill   • ACCU-CHEK LUÍS PLUS test strip      • acetaminophen (TYLENOL) 650 MG 8 hr tablet Take 650 mg by mouth every 8 (eight) hours as needed for mild pain (1-3).     • amLODIPine (NORVASC) 5 MG tablet   0   • aspirin 81 MG chewable tablet Chew 81 mg daily.     • atenolol (TENORMIN) 25 MG tablet Take 25 mg by mouth daily.     • Blood Glucose Monitoring Suppl (ACCU-CHEK LUÍS PLUS) W/DEVICE kit      • estradiol (ESTRACE) 0.5 MG tablet take 1 tablet by mouth every other day 36 tablet 1   • gabapentin (NEURONTIN) 100 MG capsule Take 1 capsule by mouth As Needed.  2   • JANUMET XR  MG tablet sustained-release 24 hour Take 1 tablet by mouth 2 (Two) Times a Day.     • JARDIANCE 25 MG tablet Take 0.5 tablets by mouth Daily.     • meloxicam (MOBIC) 15 MG tablet Take 15 mg by mouth daily.     • Mirabegron ER (MYRBETRIQ) 25 MG tablet sustained-release 24 hour 24 hr tablet Take one tablet by mouth in the morning 90 tablet 3   • Mirabegron ER (MYRBETRIQ) 50 MG tablet sustained-release 24 hour 24 hr tablet Take one tablet by mouth in the evening 90 tablet 3   • Multiple Vitamins-Minerals (DAILY MULTI VITAMIN/MINERALS PO) Take  by mouth.       No current facility-administered medications for this visit.      She is allergic to aygestin [norethindrone]; cephalosporins; codeine; penicillins; prometrium [progesterone  "micronized]; cephalexin; ciprofloxacin; hydrocodone; and nitrofurantoin macrocrystal..    Review of Systems  A comprehensive review of systems was taken.  Constitutional: negative for fever, chills, activity change, appetite change, fatigue and unexpected weight change.  Respiratory: negative  Cardiovascular: negative  Gastrointestinal: negative  Genitourinary:positive for frequency and urgency  Musculoskeletal:positive for sciatica  Behavioral/Psych: negative       /76   Ht 165.1 cm (65\")   Wt 103 kg (227 lb 3.2 oz)   LMP  (LMP Unknown)   Breastfeeding? No   BMI 37.81 kg/m²     Physical Exam    General:  alert; cooperative; well developed; well nourished  obese - Body mass index is 37.81 kg/m².   Skin:  No suspicious lesions seen   Thyroid: normal to inspection and palpation   Lungs:  clear to auscultation bilaterally   Heart:  regular rate and rhythm, S1, S2 normal, no murmur, click, rub or gallop   Breasts:  Examined in supine position  Symmetric without masses or skin dimpling  Nipples normal without inversion, lesions or discharge  There are no palpable axillary nodes   Abdomen: soft, non-tender; no masses  no umbilical or inguinal hernias are present  no hepato-splenomegaly   Pelvis: Clinical staff was present for exam  External genitalia:  normal appearance of the external genitalia including Bartholin's and [place]'s glands.  Vaginal:  normal pink mucosa without prolapse or lesions.  Cervix:  absent.  Uterus:  absent.  Adnexa:  absent, bilateral.  Rectal:  anus visually normal appearing. recto-vaginal exam unremarkable and confirms findings;  Rectocele GRADE 2     Lab Review   No data reviewed    Imaging  Mammogram results- Birads II         ASSESSMENT  Problems Addressed this Visit        Musculoskeletal and Integument    OAB (overactive bladder)    Relevant Medications    Mirabegron ER (MYRBETRIQ) 50 MG tablet sustained-release 24 hour 24 hr tablet    Mirabegron ER (MYRBETRIQ) 25 MG tablet " sustained-release 24 hour 24 hr tablet       Genitourinary    Post-menopause on HRT (hormone replacement therapy) - Primary      Other Visit Diagnoses     Encounter for gynecological examination without abnormal finding              PLAN    Medications prescribed this encounter:    New Medications Ordered This Visit   Medications   • Mirabegron ER (MYRBETRIQ) 50 MG tablet sustained-release 24 hour 24 hr tablet     Sig: Take one tablet by mouth in the evening     Dispense:  90 tablet     Refill:  3   • Mirabegron ER (MYRBETRIQ) 25 MG tablet sustained-release 24 hour 24 hr tablet     Sig: Take one tablet by mouth in the morning     Dispense:  90 tablet     Refill:  3   · Avoid straining with bowel movements and heavy lifting  · Monthly self breast assessment and annual breast imaging  · She will continue taking estradiol, 0.25 mg 3 times a week  · Calcium, 600 mg/ Vit. D, 400 IU daily; regular weight-bearing exercise  · Follow up: 12 month(s)  *Please note that portions of this documentation may have been completed with a voice recognition program.  Efforts were made to edit this dictation, but occasional words may have been mistranscribed.       This note was electronically signed.    FILIPE Narayan MD  July 23, 2019  10:34 AM

## 2020-01-03 ENCOUNTER — TRANSCRIBE ORDERS (OUTPATIENT)
Dept: ADMINISTRATIVE | Facility: HOSPITAL | Age: 70
End: 2020-01-03

## 2020-01-03 DIAGNOSIS — Z12.31 VISIT FOR SCREENING MAMMOGRAM: Primary | ICD-10-CM

## 2020-02-18 ENCOUNTER — OFFICE VISIT (OUTPATIENT)
Dept: GASTROENTEROLOGY | Facility: CLINIC | Age: 70
End: 2020-02-18

## 2020-02-18 VITALS
HEIGHT: 65 IN | HEART RATE: 87 BPM | SYSTOLIC BLOOD PRESSURE: 162 MMHG | WEIGHT: 234.6 LBS | DIASTOLIC BLOOD PRESSURE: 94 MMHG | BODY MASS INDEX: 39.09 KG/M2

## 2020-02-18 DIAGNOSIS — Z86.010 HISTORY OF ADENOMATOUS POLYP OF COLON: ICD-10-CM

## 2020-02-18 DIAGNOSIS — K59.04 CHRONIC IDIOPATHIC CONSTIPATION: ICD-10-CM

## 2020-02-18 DIAGNOSIS — N81.6 RECTOCELE: Primary | ICD-10-CM

## 2020-02-18 DIAGNOSIS — M62.89 PELVIC FLOOR DYSFUNCTION: ICD-10-CM

## 2020-02-18 PROCEDURE — 99214 OFFICE O/P EST MOD 30 MIN: CPT | Performed by: INTERNAL MEDICINE

## 2020-02-18 NOTE — PROGRESS NOTES
"GASTROENTEROLOGY OFFICE NOTE  Yulissa Bauer  [id number]  1950    CARE TEAM  Patient Care Team:  Schumer, Barry, MD as PCP - General  Schumer, Barry, MD as PCP - Family Medicine  Scott Narayan MD as Consulting Physician (Gynecology)  Génesis Ferrer PA as Physician Assistant (Endocrinology)    No ref. provider found     Chief Complaint   Patient presents with   • Constipation   • Abdominal Pain        HISTORY OF PRESENT ILLNESS:  Patient known to be seen in March 2016 for screening colonoscopy which revealed nothing more than diverticulosis.    She is here today with occasional left lower quadrant abdominal pain.  It is a cramping type pain that is intermittent and mild.  She states she has had pain \"since \"her colonoscopy.  Apparently she has a rectocele and surgery has been recommended but she has deferred.    He complained markedly with the need for straining and infrequent bowel movements.  She has a tubular adenoma on a colonoscopy and a surveillance colonoscopy is recommended 2021.    PAST MEDICAL HISTORY  Past Medical History:   Diagnosis Date   • Carcinoma in situ of skin of chest wall    • Diabetes mellitus (CMS/HCC)     TYPE 2   • Hypertension    • Inverse psoriasis    • Menopause    • Rectocele    • Sciatica    • Urine incontinence    • Uterine prolapse         PAST SURGICAL HISTORY  Past Surgical History:   Procedure Laterality Date   • BREAST CYST ASPIRATION     • COLONOSCOPY     • COLPORRHAPHY      posterior   • DILATATION AND CURETTAGE     • HYSTEROSCOPY      D&C X2   • LIPOSUCTION     • OOPHORECTOMY     • PARATHYROIDECTOMY     • PELVIC LAPAROSCOPY     • TOTAL LAPAROSCOPIC HYSTERECTOMY SALPINGO OOPHORECTOMY Bilateral    • TUBAL ABDOMINAL LIGATION          MEDICATIONS:    Current Outpatient Medications:   •  ACCU-CHEK LUÍS PLUS test strip, , Disp: , Rfl:   •  acetaminophen (TYLENOL) 650 MG 8 hr tablet, Take 650 mg by mouth every 8 (eight) hours as needed for mild pain (1-3)., " Disp: , Rfl:   •  amLODIPine (NORVASC) 5 MG tablet, , Disp: , Rfl: 0  •  aspirin 81 MG chewable tablet, Chew 81 mg daily., Disp: , Rfl:   •  atenolol (TENORMIN) 25 MG tablet, Take 25 mg by mouth daily., Disp: , Rfl:   •  Blood Glucose Monitoring Suppl (ACCU-CHEK LUÍS PLUS) W/DEVICE kit, , Disp: , Rfl:   •  Docusate Calcium (STOOL SOFTENER PO), Take  by mouth., Disp: , Rfl:   •  estradiol (ESTRACE) 0.5 MG tablet, take 1 tablet by mouth every other day, Disp: 36 tablet, Rfl: 1  •  JANUMET XR  MG tablet sustained-release 24 hour, Take 1 tablet by mouth 2 (Two) Times a Day., Disp: , Rfl:   •  JARDIANCE 25 MG tablet, Take 0.5 tablets by mouth Daily., Disp: , Rfl:   •  meloxicam (MOBIC) 15 MG tablet, Take 15 mg by mouth daily., Disp: , Rfl:   •  Mirabegron ER (MYRBETRIQ) 50 MG tablet sustained-release 24 hour 24 hr tablet, Take one tablet by mouth in the evening, Disp: 90 tablet, Rfl: 3  •  Multiple Vitamins-Minerals (DAILY MULTI VITAMIN/MINERALS PO), Take  by mouth., Disp: , Rfl:   •  gabapentin (NEURONTIN) 100 MG capsule, Take 1 capsule by mouth As Needed., Disp: , Rfl: 2  •  Mirabegron ER (MYRBETRIQ) 25 MG tablet sustained-release 24 hour 24 hr tablet, Take one tablet by mouth in the morning, Disp: 90 tablet, Rfl: 3  •  Mirabegron ER (MYRBETRIQ) 25 MG tablet sustained-release 24 hour 24 hr tablet, Take 1 tablet by mouth Daily., Disp: 28 tablet, Rfl: 0    ALLERGIES  Allergies   Allergen Reactions   • Aygestin [Norethindrone]    • Cephalosporins    • Codeine    • Penicillins    • Prometrium [Progesterone Micronized]    • Cephalexin Rash   • Ciprofloxacin Rash   • Hydrocodone Rash   • Nitrofurantoin Macrocrystal Rash       FAMILY HISTORY:  Family History   Problem Relation Age of Onset   • Stroke Father    • Heart attack Father    • Breast cancer Mother 55   • Ovarian cancer Neg Hx    • Colon cancer Neg Hx    • Colon polyps Neg Hx        SOCIAL HISTORY  Social History     Socioeconomic History   • Marital status:       Spouse name: Not on file   • Number of children: Not on file   • Years of education: Not on file   • Highest education level: Not on file   Tobacco Use   • Smoking status: Never Smoker   • Smokeless tobacco: Never Used   Substance and Sexual Activity   • Alcohol use: Yes     Comment: RARELY   • Drug use: No   • Sexual activity: Never     Birth control/protection: Abstinence     Socioeconomic History:  .  Dentist.  Non-smoker.       REVIEW OF SYSTEMS  Review of Systems   Constitutional: Negative for activity change, appetite change, chills, diaphoresis, fatigue, fever, unexpected weight gain and unexpected weight loss.   HENT: Positive for postnasal drip, sinus pressure and sneezing. Negative for congestion, dental problem, drooling, ear discharge, ear pain, facial swelling, hearing loss, mouth sores, nosebleeds, rhinorrhea, sore throat, swollen glands, tinnitus, trouble swallowing and voice change.    Respiratory: Positive for cough. Negative for apnea, choking, chest tightness, shortness of breath, wheezing and stridor.    Cardiovascular: Negative for chest pain, palpitations and leg swelling.   Gastrointestinal: Positive for abdominal pain and constipation. Negative for abdominal distention, anal bleeding, blood in stool, diarrhea, nausea, rectal pain, vomiting, GERD and indigestion.   Endocrine: Negative for cold intolerance, heat intolerance, polydipsia, polyphagia and polyuria.   Musculoskeletal: Negative for arthralgias, back pain, gait problem, joint swelling, myalgias, neck pain, neck stiffness and bursitis.   Allergic/Immunologic: Negative for environmental allergies, food allergies and immunocompromised state.   Neurological: Negative for dizziness, tremors, seizures, syncope, facial asymmetry, speech difficulty, weakness, light-headedness, numbness, headache, memory problem and confusion.   Hematological: Negative for adenopathy. Does not bruise/bleed easily.   Psychiatric/Behavioral:  "Negative for agitation, behavioral problems, decreased concentration, dysphoric mood, hallucinations, self-injury, sleep disturbance, suicidal ideas, negative for hyperactivity, depressed mood and stress. The patient is not nervous/anxious.      I reviewed the above-noted review of systems.    PHYSICAL EXAM   /94 (BP Location: Right arm, Patient Position: Sitting, Cuff Size: Adult)   Pulse 87   Ht 165.1 cm (65\")   Wt 106 kg (234 lb 9.6 oz)   LMP  (LMP Unknown)   BMI 39.04 kg/m²   General: Alert and oriented x 3. In no apparent or acute distress.  and No stigmata of chronic liver disease  HEENT: Anicteric slcera. Normal oropharynx  Neck: Supple. Without lymphadenopathy  CV: Regular rate and rhythm, S1, S2  Lungs: Clear to ausculation. Without rales, robchi and wheezing  Abdomen:  Soft,non-distended without palpable masses or hepatosplenomeagaly, areas of rebound tenderness or guarding.   Extremeties: without clubbing, cyanosis or edema  Neurologic:  Alert and oriented x 3 without focal motor or sensory deficits  Rectal exam: deferred        Results Review:  I reviewed the patient's new clinical results.      ASSESSMENT  1.-  History of adenomatous colonic polyps with surveillance due in 2021  2.-  Likely painful diverticular disease (not to be confused with diverticulitis)  3.-  Suspected pelvic floor dysfunction.  4.-  Chronic idiopathic constipation  5.-  Rectocele    PLAN  1.-  Linzess 72 mcg daily titrated to response and tolerance  2.-  Patient is encouraged to seek pelvic floor retraining.  There is a pelvic  that she is referred.  She will look into this.  3.-  Surveillance colonoscopy due in 2021  4.-  Return appointment in 6 weeks      I discussed the patients findings and my recommendations with patient    Toi Duff MD  2/18/2020   2:54 PM    Much of this note is an electronic transcription of spoken language to printed text. Electronic transcription of spoken " language may permit erroneous, nonsensical word phrases to be inadvertently transcribed.  Although I have reviewed the note for these errors, some may still be present.

## 2020-02-23 PROBLEM — K59.04 CHRONIC IDIOPATHIC CONSTIPATION: Status: ACTIVE | Noted: 2020-02-23

## 2020-02-23 PROBLEM — M62.89 PELVIC FLOOR DYSFUNCTION: Status: ACTIVE | Noted: 2020-02-23

## 2020-02-23 PROBLEM — Z86.0101 HISTORY OF ADENOMATOUS POLYP OF COLON: Status: ACTIVE | Noted: 2020-02-23

## 2020-02-23 PROBLEM — Z86.010 HISTORY OF ADENOMATOUS POLYP OF COLON: Status: ACTIVE | Noted: 2020-02-23

## 2020-03-06 ENCOUNTER — HOSPITAL ENCOUNTER (OUTPATIENT)
Dept: MAMMOGRAPHY | Facility: HOSPITAL | Age: 70
Discharge: HOME | End: 2020-03-06
Admitting: OBSTETRICS & GYNECOLOGY

## 2020-03-06 DIAGNOSIS — Z12.31 VISIT FOR SCREENING MAMMOGRAM: ICD-10-CM

## 2020-03-06 PROCEDURE — 77063 BREAST TOMOSYNTHESIS BI: CPT

## 2020-03-06 PROCEDURE — 77063 BREAST TOMOSYNTHESIS BI: CPT | Performed by: RADIOLOGY

## 2020-03-06 PROCEDURE — 77067 SCR MAMMO BI INCL CAD: CPT | Performed by: RADIOLOGY

## 2020-03-06 PROCEDURE — 77067 SCR MAMMO BI INCL CAD: CPT

## 2020-05-22 DIAGNOSIS — N32.81 OAB (OVERACTIVE BLADDER): ICD-10-CM

## 2020-05-26 RX ORDER — MIRABEGRON 25 MG/1
TABLET, FILM COATED, EXTENDED RELEASE ORAL
Qty: 90 TABLET | Refills: 0 | Status: SHIPPED | OUTPATIENT
Start: 2020-05-26 | End: 2020-07-28 | Stop reason: SDUPTHER

## 2020-07-28 ENCOUNTER — OFFICE VISIT (OUTPATIENT)
Dept: OBSTETRICS AND GYNECOLOGY | Facility: CLINIC | Age: 70
End: 2020-07-28

## 2020-07-28 VITALS — TEMPERATURE: 97.3 F | WEIGHT: 231 LBS | HEIGHT: 65 IN | BODY MASS INDEX: 38.49 KG/M2

## 2020-07-28 DIAGNOSIS — Z79.890 HORMONE REPLACEMENT THERAPY: ICD-10-CM

## 2020-07-28 DIAGNOSIS — Z79.890 POST-MENOPAUSE ON HRT (HORMONE REPLACEMENT THERAPY): Primary | ICD-10-CM

## 2020-07-28 DIAGNOSIS — K46.9 ENTEROCELE: ICD-10-CM

## 2020-07-28 DIAGNOSIS — Z01.419 ENCOUNTER FOR GYNECOLOGICAL EXAMINATION WITHOUT ABNORMAL FINDING: ICD-10-CM

## 2020-07-28 DIAGNOSIS — N32.81 OAB (OVERACTIVE BLADDER): ICD-10-CM

## 2020-07-28 PROBLEM — N81.6 RECTOCELE: Status: RESOLVED | Noted: 2018-07-10 | Resolved: 2020-07-28

## 2020-07-28 PROCEDURE — G0101 CA SCREEN;PELVIC/BREAST EXAM: HCPCS | Performed by: OBSTETRICS & GYNECOLOGY

## 2020-07-28 RX ORDER — ESTRADIOL 0.5 MG/1
TABLET ORAL
Qty: 30 TABLET | Refills: 3 | Status: SHIPPED | OUTPATIENT
Start: 2020-07-28 | End: 2022-08-09 | Stop reason: SDUPTHER

## 2020-07-28 RX ORDER — METFORMIN HYDROCHLORIDE 500 MG/1
1000 TABLET, EXTENDED RELEASE ORAL 2 TIMES DAILY
COMMUNITY
End: 2020-10-20 | Stop reason: SDUPTHER

## 2020-07-28 NOTE — PROGRESS NOTES
Chief Complaint   Patient presents with   • Gynecologic Exam   • Med Refill       Yulissa Bauer is a 69 y.o. year old  presenting to be seen for her annual exam.  This patient is menopausal and uses extremely low-dose estradiol therapy, 0.25 mg by mouth every other day.  She has relief of symptoms and when she discontinues her medication she notices dryness of the skin and joint symptoms.  She has no side effects on estrogen replacement therapy.  She has previously had tubal sterilization; hysteroscopy/D&C; and a robotically-assisted total laparoscopic hysterectomy/bilateral salpingo-oophorectomy/vaginal cuff suspension to uterosacral ligaments/posterior repair.  Final pathology was simple hyperplasia.  She has a known persistent enterocele which is asymptomatic.  She has symptoms of overactive bladder with urgency which is effectively treated with Myrbetriq, 25 mg in the morning and 50 mg in the evening.  She has no side effects on this medication.    SCREENING TESTS    Year 2012   Age                         PAP                         HPV high risk                         Mammogram        benign benign                ARDHA score                         Breast MRI                         Lipids                         Vitamin D                         Colonoscopy                         DEXA  Frax (hip/any)      normal                   Ovarian Screen                             She exercises regularly: yes.  She wears her seat belt: yes.  She has concerns about domestic violence: no.  She has noticed changes in height: no    GYN screening history:  · Last mammogram: was done on approximately 3/9/2020 and the result was: Birads I (Normal).  · Last DEXA: was done on approximately 2017 and the results were: normal.    No Additional Complaints Reported    The following portions of the patient's  history were reviewed and updated as appropriate:vital signs and   She  has a past medical history of Carcinoma in situ of skin of chest wall, Diabetes mellitus (CMS/HCC), Hormone replacement therapy (HRT), Hypertension, Inverse psoriasis, Menopause, Rectocele, Sciatica, Urine incontinence, and Uterine prolapse.  She does not have any pertinent problems on file.  She  has a past surgical history that includes Parathyroidectomy; Liposuction; Hysteroscopy; Dilation and curettage of uterus; Pelvic laparoscopy; Tubal ligation; total laparoscopic hysterectomy salpingo oophorectomy (Bilateral); Colporrhaphy; Oophorectomy; Breast cyst aspiration; and Colonoscopy.  Her family history includes Breast cancer (age of onset: 55) in her mother; Heart attack in her father; Stroke in her father.  She  reports that she has never smoked. She has never used smokeless tobacco. She reports that she drinks alcohol. She reports that she does not use drugs.  Current Outpatient Medications   Medication Sig Dispense Refill   • metFORMIN ER (GLUCOPHAGE-XR) 500 MG 24 hr tablet Take 1,000 mg by mouth 2 (Two) Times a Day.     • Semaglutide,0.25 or 0.5MG/DOS, (OZEMPIC) 2 MG/1.5ML solution pen-injector Inject 0.5 mg under the skin into the appropriate area as directed 1 (One) Time Per Week.     • ACCU-CHEK LUÍS PLUS test strip      • acetaminophen (TYLENOL) 650 MG 8 hr tablet Take 650 mg by mouth every 8 (eight) hours as needed for mild pain (1-3).     • amLODIPine (NORVASC) 5 MG tablet   0   • aspirin 81 MG chewable tablet Chew 81 mg daily.     • atenolol (TENORMIN) 25 MG tablet Take 25 mg by mouth daily.     • Blood Glucose Monitoring Suppl (ACCU-CHEK LUÍS PLUS) W/DEVICE kit      • Docusate Calcium (STOOL SOFTENER PO) Take  by mouth.     • estradiol (ESTRACE) 0.5 MG tablet Take 1/2 tablet by mouth every other day 30 tablet 3   • gabapentin (NEURONTIN) 100 MG capsule Take 1 capsule by mouth As Needed.  2   • JARDIANCE 25 MG tablet Take 0.5  "tablets by mouth Daily.     • linaclotide (LINZESS) 72 MCG capsule capsule Take 1 by mouth on an empty stomach in the morning at least 30 minutes before breakfast 90 capsule 2   • meloxicam (MOBIC) 15 MG tablet Take 15 mg by mouth daily.     • Mirabegron ER (MYRBETRIQ) 25 MG tablet sustained-release 24 hour 24 hr tablet Take 1 tablet by mouth Daily. 28 tablet 0   • Mirabegron ER (Myrbetriq) 25 MG tablet sustained-release 24 hour 24 hr tablet Take one tablet by mouth in the morning 90 tablet 3   • Mirabegron ER (Myrbetriq) 50 MG tablet sustained-release 24 hour 24 hr tablet TAKE 1 TABLET BY MOUTH EVERY EVENING 90 tablet 3   • Multiple Vitamins-Minerals (DAILY MULTI VITAMIN/MINERALS PO) Take  by mouth.       No current facility-administered medications for this visit.      She is allergic to aygestin [norethindrone]; cephalosporins; codeine; penicillins; prometrium [progesterone micronized]; cephalexin; ciprofloxacin; hydrocodone; and nitrofurantoin macrocrystal..    Review of Systems  A review of systems was taken.  She denies cough, fever, shortness of breath, and loss of sense of smell or taste.  Constitutional: negative for fever, chills, activity change, appetite change, fatigue and unexpected weight change.  Respiratory: negative  Cardiovascular: negative  Gastrointestinal: positive for constipation  Genitourinary:negative  Musculoskeletal:negative  Behavioral/Psych: negative       Temp 97.3 °F (36.3 °C)   Ht 165.1 cm (65\")   Wt 105 kg (231 lb)   LMP  (LMP Unknown)   Breastfeeding No   BMI 38.44 kg/m²     Physical Exam    General:  alert; cooperative; well developed; well nourished  obese - Body mass index is 38.44 kg/m².   Skin:  No suspicious lesions seen   Thyroid: normal to inspection and palpation   Lungs:  clear to auscultation bilaterally   Heart:  regular rate and rhythm, S1, S2 normal, no murmur, click, rub or gallop   Breasts:  Examined in supine position  Symmetric without masses or skin " dimpling  Nipples normal without inversion, lesions or discharge  There are no palpable axillary nodes   Abdomen: soft, non-tender; no masses  no umbilical or inguinal hernias are present  no hepato-splenomegaly   Pelvis: Clinical staff was present for exam  External genitalia:  normal appearance of the external genitalia including Bartholin's and [place]'s glands.  Vaginal:  normal pink mucosa without prolapse or lesions.  Cervix:  absent.  Uterus:  absent.  Adnexa:  absent, bilateral.  Rectal:  anus visually normal appearing. recto-vaginal exam unremarkable and confirms findings;  Enterocele GRADE 2     Lab Review   No data reviewed    Imaging  Mammogram results and DEXA         ASSESSMENT  Problems Addressed this Visit        Digestive    Enterocele       Genitourinary    OAB (overactive bladder)    Relevant Medications    Mirabegron ER (Myrbetriq) 25 MG tablet sustained-release 24 hour 24 hr tablet    Mirabegron ER (Myrbetriq) 50 MG tablet sustained-release 24 hour 24 hr tablet    Post-menopause on HRT (hormone replacement therapy) - Primary      Other Visit Diagnoses     Encounter for gynecological examination without abnormal finding        Hormone replacement therapy        Relevant Medications    estradiol (ESTRACE) 0.5 MG tablet              Substance History:   reports that she has never smoked. She has never used smokeless tobacco.   reports that she drinks alcohol.   reports that she does not use drugs.    Substance use counseling is not indicated based on patient history.  She indicates that her alcohol intake is controlled.      PLAN    Medications prescribed this encounter:    New Medications Ordered This Visit   Medications   • Mirabegron ER (Myrbetriq) 25 MG tablet sustained-release 24 hour 24 hr tablet     Sig: Take one tablet by mouth in the morning     Dispense:  90 tablet     Refill:  3   • Mirabegron ER (Myrbetriq) 50 MG tablet sustained-release 24 hour 24 hr tablet     Sig: TAKE 1 TABLET BY  MOUTH EVERY EVENING     Dispense:  90 tablet     Refill:  3   • estradiol (ESTRACE) 0.5 MG tablet     Sig: Take 1/2 tablet by mouth every other day     Dispense:  30 tablet     Refill:  3   · Monthly self breast assessment and annual breast imaging  · Avoid straining with bowel movements  · Continue pelvic floor training  · Calcium, 600 mg/ Vit. D, 400 IU daily; regular weight-bearing exercise  · Follow up: 12 month(s)  *Please note that portions of this documentation may have been completed with a voice recognition program.  Efforts were made to edit this dictation, but occasional words may have been mistranscribed.       This note was electronically signed.    FILIPE Narayan MD  July 28, 2020  09:58

## 2020-09-15 ENCOUNTER — OFFICE VISIT (OUTPATIENT)
Dept: OBSTETRICS AND GYNECOLOGY | Facility: CLINIC | Age: 70
End: 2020-09-15

## 2020-09-15 VITALS
SYSTOLIC BLOOD PRESSURE: 150 MMHG | BODY MASS INDEX: 37.29 KG/M2 | HEIGHT: 65 IN | TEMPERATURE: 97.3 F | DIASTOLIC BLOOD PRESSURE: 88 MMHG | WEIGHT: 223.8 LBS

## 2020-09-15 DIAGNOSIS — Z79.890 POST-MENOPAUSE ON HRT (HORMONE REPLACEMENT THERAPY): ICD-10-CM

## 2020-09-15 DIAGNOSIS — K46.9 ENTEROCELE: Primary | ICD-10-CM

## 2020-09-15 PROCEDURE — 99213 OFFICE O/P EST LOW 20 MIN: CPT | Performed by: OBSTETRICS & GYNECOLOGY

## 2020-09-15 PROCEDURE — 57160 INSERT PESSARY/OTHER DEVICE: CPT | Performed by: OBSTETRICS & GYNECOLOGY

## 2020-09-15 PROCEDURE — A4562 PESSARY, NON RUBBER,ANY TYPE: HCPCS | Performed by: OBSTETRICS & GYNECOLOGY

## 2020-09-15 NOTE — PROGRESS NOTES
"Date of procedure:  9/15/2020-this patient has previously had a robotically-assisted total laparoscopic hysterectomy/bilateral salpingo-oophorectomy/vaginal cuff suspension to uterosacral ligament/posterior repair for simple endometrial hyperplasia and a rectocele.  She subsequently has developed an enterocele above the rectocele repair.  She has been seeing physical therapy for pelvic floor dysfunction.  She is also treated for overactive bladder with Myrbetriq.  The physical therapist has recommended that she be fitted with a pessary to assist in her therapy.  The patient is interested in this.  She has used a diaphragm before.    Risks and benefits discussed? Yes including the risks of difficulty with urination and defecation.  I have indicated that this may increase her vaginal discharge.  All questions answered? yes      A review of systems was done.  She denies cough, fever, shortness of breath, and loss of her sense of taste or smell.  Constitutional: negative for fever, chills, activity change, appetite change, fatigue and unexpected weight change.  Respiratory: negative  Cardiovascular: negative  Gastrointestinal: negative  Genitourinary:negative  Musculoskeletal:sciatica  Behavioral/Psych: negative    /88   Temp 97.3 °F (36.3 °C)   Ht 165.1 cm (65\")   Wt 102 kg (223 lb 12.8 oz)   LMP  (LMP Unknown)   Breastfeeding No   BMI 37.24 kg/m²   Lungs- Clear to auscultation  Heart- RRR with no murmur, rub or gallop  Abdomen- soft, non-tender with no orgaomegaly     Clinical staff was present for exam  External genitalia:  normal appearance of the external genitalia including Bartholin's and [place]'s glands.  Vaginal:  normal pink mucosa without prolapse or lesions.  Cervix:  absent.  Uterus:  absent.  Adnexa:  absent, bilateral.  Rectal:  anus visually normal appearing. recto-vaginal exam unremarkable and confirms findings;  Enterocele GRADE 2     The patient was fitted with a #4 ring pessary with " support.  She tolerated this well with ambulation.        ASSESSMENT  Problems Addressed this Visit        Digestive    Enterocele - Primary       Genitourinary    Post-menopause on HRT (hormone replacement therapy)          PLAN   1. Medications prescribed this encounter:  No orders of the defined types were placed in this encounter.  2. Fitted with a #4 ring pessary with support.  The patient plans to remove and replace the pessary on her own  3. Follow up: 3 month(s)  *Please note that portions of this documentation may have been completed with a voice recognition program.  Efforts were made to edit this dictation, but occasional words may have been mistranscribed.    Post procedure instructions: Call ASAP if increasing pain or trouble passing urine or   bowels    Counseling was given to patient for the following topics: diagnostic results, instructions for management, risk factor reductions, impressions, risks and benefits of treatment options and importance of treatment compliance . Total time of the encounter was 21 minute(s) and 11 minute(s)  was spent counseling.  I have reminded the patient that if she has any difficulty with urination or defecation she should call me immediately.  I have reminded the patient that this pessary may be expelled and if so we will need to use a #5 ring pessary with support.      *Please note that portions of this documentation may have been completed with a voice recognition program.  Efforts were made to edit this dictation, but occasional words may have been mistranscribed.  This note was electronically signed.    FILIPE Narayan MD  September 15, 2020  16:20 EDT

## 2020-10-08 ENCOUNTER — TELEPHONE (OUTPATIENT)
Dept: ENDOCRINOLOGY | Facility: CLINIC | Age: 70
End: 2020-10-08

## 2020-10-09 NOTE — TELEPHONE ENCOUNTER
Patient called and states that she is almost out of the Linzess and is still constipated but since the ozempic regulation shes a little better but not soley. Wanted to also increase to take it 2 times daily if possible. Would you also want her to schedule follow up?

## 2020-10-20 RX ORDER — METFORMIN HYDROCHLORIDE 500 MG/1
1000 TABLET, EXTENDED RELEASE ORAL 2 TIMES DAILY
Qty: 360 TABLET | Refills: 3 | Status: SHIPPED | OUTPATIENT
Start: 2020-10-20 | End: 2021-01-06 | Stop reason: SDUPTHER

## 2020-12-15 ENCOUNTER — OFFICE VISIT (OUTPATIENT)
Dept: ENDOCRINOLOGY | Facility: CLINIC | Age: 70
End: 2020-12-15

## 2020-12-15 VITALS
BODY MASS INDEX: 36.47 KG/M2 | HEIGHT: 65 IN | RESPIRATION RATE: 16 BRPM | WEIGHT: 218.9 LBS | SYSTOLIC BLOOD PRESSURE: 122 MMHG | HEART RATE: 77 BPM | DIASTOLIC BLOOD PRESSURE: 80 MMHG

## 2020-12-15 DIAGNOSIS — E11.65 TYPE 2 DIABETES MELLITUS WITH HYPERGLYCEMIA, WITHOUT LONG-TERM CURRENT USE OF INSULIN (HCC): Primary | ICD-10-CM

## 2020-12-15 DIAGNOSIS — I10 ESSENTIAL HYPERTENSION: ICD-10-CM

## 2020-12-15 LAB
EXPIRATION DATE: NORMAL
HBA1C MFR BLD: 7.1 %
Lab: NORMAL

## 2020-12-15 PROCEDURE — 83036 HEMOGLOBIN GLYCOSYLATED A1C: CPT | Performed by: PHYSICIAN ASSISTANT

## 2020-12-15 PROCEDURE — 99213 OFFICE O/P EST LOW 20 MIN: CPT | Performed by: PHYSICIAN ASSISTANT

## 2020-12-15 NOTE — PROGRESS NOTES
Office Note      Date: 12/15/2020  Patient Name: Yulissa Bauer  MRN: [id number]  : 1950    Chief Complaint   Patient presents with   • Diabetes     Follow up        History of Present Illness:   Yulissa Bauer is a 70 y.o. female who presents today for type 2 diabetes.   Changed Januvia to Ozempic this summer.  She noted constipation on the 0.5mg dose, so we decreased to 0.25mg weekly.  She reports tolerating this well.  She remains on metformin ER and Jardiance.  She is testing fasting FSBS daily.  Readings have been higher, often mid 100s.  Some up to 200 while on steroids.  Readings have improved in the last few weeks though.  She has been on steroids several times over the past 3 months.  She reports having back surgery - fusion of L4/L5.  She reports that this significantly improved pain on the right side, but she is now having pain on left.  She was noted to have renal insufficiency.  She is now off mobic.  She is not taking ibuprofen.  She is taking gabapentin and tylenol as needed.   She reports being treated for UTI - resistant to Bactrim, then treated with azithromycin.  She will be having labs repeated in several weeks.  She was incidentally noted to have mass on right kidney.  She is scheduled to see her urologist.  She denies any sores on her feet.  Eye exam up-to-date.    Subjective      Review of Systems:   Review of Systems   Constitutional: Positive for activity change (decreased). Negative for appetite change, chills, fatigue, fever and unexpected weight change.   Respiratory: Positive for shortness of breath. Negative for cough and wheezing.    Cardiovascular: Negative for chest pain, palpitations and leg swelling.   Gastrointestinal: Negative for abdominal pain, constipation, diarrhea, nausea and vomiting.   Endocrine: Negative for cold intolerance, heat intolerance, polydipsia, polyphagia and polyuria.   Musculoskeletal: Positive for arthralgias and back pain.   Neurological:  "Negative for tremors, syncope, weakness, numbness and headaches.       The following portions of the patient's history were reviewed and updated as appropriate: allergies, current medications, past family history, past medical history, past social history, past surgical history and problem list.    Objective     Vitals:    12/15/20 1143   BP: 122/80   Pulse: 77   Resp: 16   Weight: 99.3 kg (218 lb 14.4 oz)   Height: 165.1 cm (65\")     Body mass index is 36.43 kg/m².    Physical Exam  Vitals signs reviewed.   Constitutional:       General: She is not in acute distress.     Appearance: Normal appearance.   Cardiovascular:      Pulses:           Dorsalis pedis pulses are 2+ on the right side and 2+ on the left side.        Posterior tibial pulses are 2+ on the right side and 2+ on the left side.   Musculoskeletal:      Right foot: Deformity (pes planus) present.      Left foot: Deformity (pes planus) present.   Feet:      Right foot:      Protective Sensation: 10 sites tested. 10 sites sensed.      Skin integrity: Dry skin present.      Toenail Condition: Right toenails are normal.      Left foot:      Protective Sensation: 10 sites tested. 10 sites sensed.      Skin integrity: Dry skin present.      Toenail Condition: Left toenails are normal.   Neurological:      Mental Status: She is alert and oriented to person, place, and time.   Psychiatric:         Mood and Affect: Mood and affect normal.         HEMOGLOBIN A1C  Lab Results   Component Value Date    HGBA1C 7.1 12/15/2020       Current Outpatient Medications   Medication Instructions   • ACCU-CHEK LUÍS PLUS test strip No dose, route, or frequency recorded.   • acetaminophen (TYLENOL) 1,000 mg, Oral, Every 8 Hours PRN   • amLODIPine (NORVASC) 5 MG tablet No dose, route, or frequency recorded.   • aspirin 81 mg, Oral, Every Other Day   • atenolol (TENORMIN) 25 mg, Oral, Daily   • Blood Glucose Monitoring Suppl (ACCU-CHEK LUÍS PLUS) W/DEVICE kit No dose, route, or " frequency recorded.   • estradiol (ESTRACE) 0.5 MG tablet Take 1/2 tablet by mouth every other day   • gabapentin (NEURONTIN) 100 MG capsule 1 capsule, Oral, As Needed   • JARDIANCE 25 MG tablet 1 tablet, Oral, Daily   • linaclotide (LINZESS) 72 MCG capsule capsule Take 1 by mouth on an empty stomach in the morning at least 30 minutes before breakfast twice daily.,   • metFORMIN ER (GLUCOPHAGE-XR) 1,000 mg, Oral, 2 Times Daily   • Mirabegron ER (Myrbetriq) 50 MG tablet sustained-release 24 hour 24 hr tablet TAKE 1 TABLET BY MOUTH EVERY EVENING   • Multiple Vitamins-Minerals (DAILY MULTI VITAMIN/MINERALS PO) Oral   • Semaglutide(0.25 or 0.5MG/DOS) (OZEMPIC) 0.25 mg, Subcutaneous, Weekly       Assessment / Plan      Assessment & Plan:  1. Type 2 diabetes mellitus with hyperglycemia, without long-term current use of insulin (CMS/[facility])  A1c increased, but not as high as expected due to steroids and surgery.  Weight is down 15 pounds since the summer.  Will continue current medications and follow kidney function.  She will have results sent here.  If she develops another UTI, will recommend stopping Jardiance.  - POC Glycosylated Hemoglobin (Hb A1C)    2. Essential hypertension  BP okay.  Continue current treatment.      Return in about 3 months (around 3/15/2021) for Next scheduled follow up.     ODESSA Huitron  12/15/2020

## 2021-01-06 RX ORDER — METFORMIN HYDROCHLORIDE 500 MG/1
1000 TABLET, EXTENDED RELEASE ORAL 2 TIMES DAILY
Qty: 360 TABLET | Refills: 3 | Status: SHIPPED | OUTPATIENT
Start: 2021-01-06 | End: 2021-08-13 | Stop reason: SDUPTHER

## 2021-01-08 ENCOUNTER — PATIENT MESSAGE (OUTPATIENT)
Dept: ENDOCRINOLOGY | Facility: CLINIC | Age: 71
End: 2021-01-08

## 2021-01-08 ENCOUNTER — TELEPHONE (OUTPATIENT)
Dept: ENDOCRINOLOGY | Facility: CLINIC | Age: 71
End: 2021-01-08

## 2021-01-08 NOTE — TELEPHONE ENCOUNTER
DR BURTON CALLED TODAY STATING THAT SHE HAS ANOTHER UTI AND BELIEVES SHE WILL NEED TO DISCONTINUE THE JARDIANCE BECAUSE OF THIS . SHE STATES THAT [facility] MAY EMAIL HER IN RETURN.

## 2021-02-09 ENCOUNTER — TRANSCRIBE ORDERS (OUTPATIENT)
Dept: ADMINISTRATIVE | Facility: HOSPITAL | Age: 71
End: 2021-02-09

## 2021-02-09 DIAGNOSIS — Z12.31 VISIT FOR SCREENING MAMMOGRAM: Primary | ICD-10-CM

## 2021-02-17 ENCOUNTER — PATIENT MESSAGE (OUTPATIENT)
Dept: ENDOCRINOLOGY | Facility: CLINIC | Age: 71
End: 2021-02-17

## 2021-03-09 ENCOUNTER — APPOINTMENT (OUTPATIENT)
Dept: MAMMOGRAPHY | Facility: HOSPITAL | Age: 71
End: 2021-03-09

## 2021-03-11 ENCOUNTER — PATIENT MESSAGE (OUTPATIENT)
Dept: ENDOCRINOLOGY | Facility: CLINIC | Age: 71
End: 2021-03-11

## 2021-03-30 ENCOUNTER — PATIENT MESSAGE (OUTPATIENT)
Dept: ENDOCRINOLOGY | Facility: CLINIC | Age: 71
End: 2021-03-30

## 2021-04-01 ENCOUNTER — PATIENT MESSAGE (OUTPATIENT)
Dept: ENDOCRINOLOGY | Facility: CLINIC | Age: 71
End: 2021-04-01

## 2021-04-23 ENCOUNTER — HOSPITAL ENCOUNTER (OUTPATIENT)
Dept: MAMMOGRAPHY | Facility: HOSPITAL | Age: 71
Discharge: HOME | End: 2021-04-23
Admitting: OBSTETRICS & GYNECOLOGY

## 2021-04-23 DIAGNOSIS — Z12.31 VISIT FOR SCREENING MAMMOGRAM: ICD-10-CM

## 2021-04-23 PROCEDURE — 77063 BREAST TOMOSYNTHESIS BI: CPT | Performed by: RADIOLOGY

## 2021-04-23 PROCEDURE — 77067 SCR MAMMO BI INCL CAD: CPT | Performed by: RADIOLOGY

## 2021-04-23 PROCEDURE — 77067 SCR MAMMO BI INCL CAD: CPT

## 2021-04-23 PROCEDURE — 77063 BREAST TOMOSYNTHESIS BI: CPT

## 2021-05-10 ENCOUNTER — PATIENT MESSAGE (OUTPATIENT)
Dept: ENDOCRINOLOGY | Facility: CLINIC | Age: 71
End: 2021-05-10

## 2021-05-11 ENCOUNTER — OFFICE VISIT (OUTPATIENT)
Dept: ENDOCRINOLOGY | Facility: CLINIC | Age: 71
End: 2021-05-11

## 2021-05-11 ENCOUNTER — TELEPHONE (OUTPATIENT)
Dept: ENDOCRINOLOGY | Facility: CLINIC | Age: 71
End: 2021-05-11

## 2021-05-11 VITALS
BODY MASS INDEX: 36.32 KG/M2 | OXYGEN SATURATION: 100 % | WEIGHT: 218 LBS | HEART RATE: 75 BPM | SYSTOLIC BLOOD PRESSURE: 128 MMHG | DIASTOLIC BLOOD PRESSURE: 74 MMHG | HEIGHT: 65 IN | TEMPERATURE: 97.7 F

## 2021-05-11 DIAGNOSIS — E11.65 TYPE 2 DIABETES MELLITUS WITH HYPERGLYCEMIA, WITHOUT LONG-TERM CURRENT USE OF INSULIN (HCC): Primary | Chronic | ICD-10-CM

## 2021-05-11 DIAGNOSIS — E66.9 CLASS 2 OBESITY WITH BODY MASS INDEX (BMI) OF 36.0 TO 36.9 IN ADULT, UNSPECIFIED OBESITY TYPE, UNSPECIFIED WHETHER SERIOUS COMORBIDITY PRESENT: Chronic | ICD-10-CM

## 2021-05-11 PROBLEM — E66.812 CLASS 2 OBESITY IN ADULT: Chronic | Status: ACTIVE | Noted: 2021-05-11

## 2021-05-11 LAB
EXPIRATION DATE: NORMAL
HBA1C MFR BLD: 7.4 %
Lab: NORMAL

## 2021-05-11 PROCEDURE — 99214 OFFICE O/P EST MOD 30 MIN: CPT | Performed by: PHYSICIAN ASSISTANT

## 2021-05-11 PROCEDURE — 3051F HG A1C>EQUAL 7.0%<8.0%: CPT | Performed by: PHYSICIAN ASSISTANT

## 2021-05-11 PROCEDURE — 83036 HEMOGLOBIN GLYCOSYLATED A1C: CPT | Performed by: PHYSICIAN ASSISTANT

## 2021-05-11 RX ORDER — BLOOD SUGAR DIAGNOSTIC
STRIP MISCELLANEOUS
Qty: 100 EACH | Refills: 3 | Status: SHIPPED | OUTPATIENT
Start: 2021-05-11 | End: 2021-08-06

## 2021-05-11 NOTE — TELEPHONE ENCOUNTER
PATIENT NEEDS US TO SEND THE EDDA FREESTYLE TO [facility]. SHE ALSO NEEDS TEST STRIPS CALLED IN AS WELL. PHARMACY PHONE NUMBER [phone]

## 2021-05-11 NOTE — PROGRESS NOTES
"     Office Note      Date: 2021  Patient Name: Yulissa Bauer  MRN: [id number]  : 1950    Chief Complaint   Patient presents with   • Diabetes       History of Present Illness:   Yulissa Bauer is a 70 y.o. female who presents today for type 2 diabetes.  She had right partial nephrectomy for renal cell carcinoma on 3/4/2021.  She reports that margins were negative.  She will have CT scan 6 months after surgery.  She was told that right kidney is functioning poorly.  She reports eGFR has been fluctuating and was down to 35 at one point.  She thinks that most recent eGFR was back up to 75.  She had labs last month with her PCP, Dr. Barry Schumer.  Stopped Jardiance after she developed another UTI in January.  Due to UTI in January.  She remains on metformin and Ozempic.  She is tolerating the Ozempic at 0.25 mg weekly.  She had noted constipation on the higher dose.  She has noted weight loss on the Ozempic.  Fasting blood sugars are mid to upper 100s.  She had steroid injection due to hip pain about 1.5 months ago.  She denies any sores on her feet.  Foot exam up to date.  Occasionally tingling in first toe that she relates to sciatic nerve.  Eye exam up to date.    Subjective      Review of Systems:   Review of Systems   Constitutional: Negative.    Cardiovascular: Negative.    Endocrine: Negative.    Musculoskeletal: Positive for arthralgias and back pain.       The following portions of the patient's history were reviewed and updated as appropriate: allergies, current medications, past family history, past medical history, past social history, past surgical history and problem list.    Objective     Vitals:    21 1110   BP: 128/74   BP Location: Left arm   Patient Position: Sitting   Cuff Size: Adult   Pulse: 75   Temp: 97.7 °F (36.5 °C)   TempSrc: Infrared   SpO2: 100%   Weight: 98.9 kg (218 lb)   Height: 165.1 cm (65\")   PainSc: 0-No pain     Body mass index is 36.28 " kg/m².    Physical Exam  Vitals reviewed.   Constitutional:       General: She is not in acute distress.  Neurological:      Mental Status: She is alert and oriented to person, place, and time.   Psychiatric:         Mood and Affect: Affect normal.         HEMOGLOBIN A1C  Lab Results   Component Value Date    HGBA1C 7.4 05/11/2021    HGBA1C 7.1 12/15/2020       Current Outpatient Medications   Medication Instructions   • Accu-Chek Rolanda Plus test strip Testing 1 time per day; E11.65   • acetaminophen (TYLENOL) 1,000 mg, Oral, Every 8 Hours PRN   • amLODIPine (NORVASC) 5 MG tablet No dose, route, or frequency recorded.   • aspirin 81 mg, Oral, Every Other Day   • atenolol (TENORMIN) 25 mg, Oral, Daily   • Blood Glucose Monitoring Suppl (ACCU-CHEK ROLANDA PLUS) W/DEVICE kit No dose, route, or frequency recorded.   • Continuous Blood Gluc Sensor (FreeStyle Tamir 2 Sensor) misc 1 Device, Does not apply, Every 14 Days   • estradiol (ESTRACE) 0.5 MG tablet Take 1/2 tablet by mouth every other day   • gabapentin (NEURONTIN) 100 MG capsule 1 capsule, Oral, As Needed   • linaclotide (LINZESS) 72 MCG capsule capsule Take 1 by mouth on an empty stomach in the morning at least 30 minutes before breakfast twice daily.,   • metFORMIN ER (GLUCOPHAGE-XR) 1,000 mg, Oral, 2 Times Daily   • Mirabegron ER (Myrbetriq) 50 MG tablet sustained-release 24 hour 24 hr tablet TAKE 1 TABLET BY MOUTH EVERY EVENING   • Multiple Vitamins-Minerals (DAILY MULTI VITAMIN/MINERALS PO) Oral   • Semaglutide(0.25 or 0.5MG/DOS) (OZEMPIC) 0.5 mg, Subcutaneous, Weekly       Assessment / Plan      Assessment & Plan:  1. Type 2 diabetes mellitus with hyperglycemia, without long-term current use of insulin (CMS/[facility])  A1c has increased, but not too bad considering surgery and steroid injection.  Fasting blood sugars above goal - may be rising significantly in early morning hours.  She has lost weight.  She does feel that this is related to Ozempic.  She would like  to try increasing back to 0.5 mg weekly.  She will decrease back if develops constipation.  Will keep a close eye on her renal function had adjust medications if needed.  Provided sample Tamir 2 CGM today to get a better picture of her blood sugars throughout the day.  Advised that she does not qualify for CGM under Medicare guidelines.   - POC Glycosylated Hemoglobin (Hb A1C)    2. Class 2 obesity with body mass index (BMI) of 36.0 to 36.9 in adult, unspecified obesity type, unspecified whether serious comorbidity present  Weight is down 15 pounds from a year ago.  She will continue to work on diet.  Exercise has been limited due to surgeries.      Return in about 3 months (around 8/11/2021) for Next scheduled follow up.     ODESSA Huitron  Endocrinology  05/11/2021

## 2021-05-11 NOTE — TELEPHONE ENCOUNTER
PT CALLED STATING THAT HER INSURANCE WILL PAY FOR THE AvtozaperE 2 WITH A PA. PLEASE SEND IN RX TO [facility] [address]. THANK YOU

## 2021-05-12 NOTE — TELEPHONE ENCOUNTER
She had labs with Dr. Barry Schumer in April.  Please call to get copy of results - looking for BMP or CMP.  Thank you.

## 2021-06-08 ENCOUNTER — PATIENT MESSAGE (OUTPATIENT)
Dept: ENDOCRINOLOGY | Facility: CLINIC | Age: 71
End: 2021-06-08

## 2021-06-08 RX ORDER — GLIPIZIDE 2.5 MG/1
2.5 TABLET, EXTENDED RELEASE ORAL DAILY
Qty: 30 TABLET | Refills: 3 | Status: SHIPPED | OUTPATIENT
Start: 2021-06-08 | End: 2021-08-13 | Stop reason: SDUPTHER

## 2021-06-22 ENCOUNTER — PATIENT MESSAGE (OUTPATIENT)
Dept: ENDOCRINOLOGY | Facility: CLINIC | Age: 71
End: 2021-06-22

## 2021-08-03 ENCOUNTER — OFFICE VISIT (OUTPATIENT)
Dept: OBSTETRICS AND GYNECOLOGY | Facility: CLINIC | Age: 71
End: 2021-08-03

## 2021-08-03 VITALS
SYSTOLIC BLOOD PRESSURE: 160 MMHG | HEIGHT: 65 IN | WEIGHT: 214 LBS | BODY MASS INDEX: 35.65 KG/M2 | DIASTOLIC BLOOD PRESSURE: 88 MMHG

## 2021-08-03 DIAGNOSIS — Z79.890 POST-MENOPAUSE ON HRT (HORMONE REPLACEMENT THERAPY): ICD-10-CM

## 2021-08-03 DIAGNOSIS — K46.9 ENTEROCELE: ICD-10-CM

## 2021-08-03 DIAGNOSIS — Z01.411 ENCOUNTER FOR GYNECOLOGICAL EXAMINATION WITH ABNORMAL FINDING: Primary | ICD-10-CM

## 2021-08-03 DIAGNOSIS — N32.81 OAB (OVERACTIVE BLADDER): ICD-10-CM

## 2021-08-03 PROCEDURE — A4562 PESSARY, NON RUBBER,ANY TYPE: HCPCS | Performed by: OBSTETRICS & GYNECOLOGY

## 2021-08-03 PROCEDURE — 57160 INSERT PESSARY/OTHER DEVICE: CPT | Performed by: OBSTETRICS & GYNECOLOGY

## 2021-08-03 PROCEDURE — G0101 CA SCREEN;PELVIC/BREAST EXAM: HCPCS | Performed by: OBSTETRICS & GYNECOLOGY

## 2021-08-03 NOTE — PROGRESS NOTES
Chief Complaint   Patient presents with   • Gynecologic Exam     patient has had Covid.  has not had vaccine.   • Pessary Check     #5 ring with support.  patient states that pessary is coming out several times/day.   • Med Refill     patient will call for prescription refills       Yulissa Bauer is a 70 y.o. year old  presenting to be seen for her annual exam.  This patient has previously had a partial nephrectomy by Dr. Tay.  She has had a robotically-assisted total laparoscopic hysterectomy/bilateral salpingo-oophorectomy/vaginal vault suspension to uterosacral ligaments for uterine prolapse-grade 2, a rectocele-grade 2, and simple endometrial hyperplasia.  Since that surgery in , she has developed a large enterocele.  She is currently managed with a #5 ring pessary with support.  That pessary is expelled when she coughs or sneezes.  She desires a larger pessary.  She has no urinary incontinence.  She does have symptoms of overactive bladder and is treated with Myrbetriq, 50 mg daily.  She takes estradiol, 0.5 mg by mouth daily for relief of menopausal symptoms and does not desire to discontinue this medication.  She has had Covid-19 infection.    SCREENING TESTS    Year 2012 2015 2016 2017 2018 2019 2020 202 [id number]   Age                         PAP                         HPV high risk                         Mammogram         benign benign               RADHA score                         Breast MRI                         Lipids                         Vitamin D                         Colonoscopy                         DEXA  Frax (hip/any)      normal                   Ovarian Screen                             She exercises regularly: yes.  She wears her seat belt: yes.  She has concerns about domestic violence: no.  She has noticed changes in height: no    GYN screening history:  · Last mammogram: was done on  approximately 4/23/2021 and the result was: Birads II (Benign findings).  · Last DEXA: was done on approximately 7/18/2017 and the results were: normal.    No Additional Complaints Reported    The following portions of the patient's history were reviewed and updated as appropriate:vital signs and   She  has a past medical history of Benign essential hypertension, Carcinoma in situ of skin of chest wall, Disorder associated with type 2 diabetes mellitus (CMS/HCC), History of nephrolithiasis, Hormone replacement therapy (HRT), Hyperparathyroidism (CMS/HCC), Hypertension, Hypertriglyceridemia, Injury of left foot, Inverse psoriasis, Menopause, Obesity, Personal history of kidney cancer, Primary hyperparathyroidism (CMS/HCC), Rectocele, Sciatica, Type 2 diabetes mellitus (CMS/HCC), Urine incontinence, and Uterine prolapse.  She does not have any pertinent problems on file.  She  has a past surgical history that includes Parathyroidectomy (06/2006); Liposuction; Hysteroscopy; Dilation and curettage of uterus; Pelvic laparoscopy; Tubal ligation; Colporrhaphy; Colonoscopy; Other surgical history; Lumbar fusion; Breast cyst aspiration (Left); total laparoscopic hysterectomy salpingo oophorectomy (Bilateral, ~ 2012); Oophorectomy (Bilateral, ~ 2012); Spinal fusion; and Laparoscopic partial nephrectomy.  Her family history includes Breast cancer (age of onset: 55) in her mother; Diabetes in her mother; Heart attack in her father; Heart disease in her mother; Stroke in her brother and father.  She  reports that she has never smoked. She has never used smokeless tobacco. She reports current alcohol use. She reports that she does not use drugs.  Current Outpatient Medications   Medication Sig Dispense Refill   • Accu-Chek Rolanda Plus test strip Testing 1 time per day; E11.65 100 each 3   • acetaminophen (TYLENOL) 650 MG 8 hr tablet Take 1,000 mg by mouth Every 8 (Eight) Hours As Needed for Mild Pain .     • amLODIPine (NORVASC) 5  MG tablet   0   • aspirin 81 MG chewable tablet Chew 81 mg Every Other Day.     • atenolol (TENORMIN) 25 MG tablet Take 25 mg by mouth daily.     • Blood Glucose Monitoring Suppl (ACCU-CHEK LUÍS PLUS) W/DEVICE kit      • Continuous Blood Gluc Sensor (FreeStyle Tamir 2 Sensor) misc 1 Device Every 14 (Fourteen) Days. 2 each 11   • estradiol (ESTRACE) 0.5 MG tablet Take 1/2 tablet by mouth every other day 30 tablet 3   • gabapentin (NEURONTIN) 100 MG capsule Take 1 capsule by mouth As Needed.  2   • glipizide (GLUCOTROL XL) 2.5 MG 24 hr tablet Take 1 tablet by mouth Daily. 30 tablet 3   • linaclotide (LINZESS) 72 MCG capsule capsule Take 1 by mouth on an empty stomach in the morning at least 30 minutes before breakfast twice daily., 180 capsule 0   • metFORMIN ER (GLUCOPHAGE-XR) 500 MG 24 hr tablet Take 2 tablets by mouth 2 (Two) Times a Day. 360 tablet 3   • Mirabegron ER (Myrbetriq) 50 MG tablet sustained-release 24 hour 24 hr tablet TAKE 1 TABLET BY MOUTH EVERY EVENING 90 tablet 3   • Multiple Vitamins-Minerals (DAILY MULTI VITAMIN/MINERALS PO) Take  by mouth.     • Semaglutide,0.25 or 0.5MG/DOS, (OZEMPIC) 2 MG/1.5ML solution pen-injector Inject 0.5 mg under the skin into the appropriate area as directed 1 (One) Time Per Week.       No current facility-administered medications for this visit.     She is allergic to dilaudid [hydromorphone], aygestin [norethindrone], cephalosporins, codeine, penicillins, prometrium [progesterone], cephalexin, ciprofloxacin, hydrocodone, and nitrofurantoin macrocrystal..    Review of Systems  A review of systems was taken.  Constitutional: negative for fever, chills, activity change, appetite change, fatigue and unexpected weight change.  Respiratory: negative  Cardiovascular: negative  Gastrointestinal: negative  Genitourinary:positive for pelvic pressure  Musculoskeletal:negative  Behavioral/Psych: negative     Counseling/Anticipatory Guidance Discussed: nutrition, physical  "activity, healthy weight, immunizations, screenings, self-breast exam and bladder health      /88   Ht 165.1 cm (65\")   Wt 97.1 kg (214 lb)   LMP  (LMP Unknown) Comment: LMP PM  Breastfeeding No   BMI 35.61 kg/m²     BMI reviewed     MEDICALLY INDICATED   Physical Exam    Neuro: alert and oriented to person, place and time    General:  alert; cooperative; well developed; well nourished   Skin:  No suspicious lesions seen   Thyroid: normal to inspection and palpation   Lungs:  breathing is unlabored  clear to auscultation bilaterally   Heart:  regular rate and rhythm, S1, S2 normal, no murmur, click, rub or gallop  normal apical impulse   Breasts:  Examined in supine position  Symmetric without masses or skin dimpling  Nipples normal without inversion, lesions or discharge  There are no palpable axillary nodes   Abdomen: soft, non-tender; no masses  no umbilical or inguinal hernias are present  no hepato-splenomegaly   Pelvis: Clinical staff was present for exam  External genitalia:  normal appearance of the external genitalia including Bartholin's and [place]'s glands.  Vaginal:  normal pink mucosa without prolapse or lesions.  Cervix:  absent.  Uterus:  absent.  Adnexa:  absent, bilateral.  Rectal:  anus visually normal appearing. recto-vaginal exam unremarkable and confirms findings;  Enterocele GRADE 3                             The patient was fitted with a #6 ring pessary with support with good results.      Lab Review   No data reviewed    Imaging  Mammogram results              ASSESSMENT  Problems Addressed this Visit        Gastrointestinal Abdominal     Enterocele       Genitourinary and Reproductive     OAB (overactive bladder)    Post-menopause on HRT (hormone replacement therapy)      Other Visit Diagnoses     Encounter for gynecological examination with abnormal finding    -  Primary      Diagnoses       Codes Comments    Encounter for gynecological examination with abnormal finding    -  " Primary ICD-10-CM: Z01.411  ICD-9-CM: V72.31     Enterocele     ICD-10-CM: K46.9  ICD-9-CM: 553.9     Post-menopause on HRT (hormone replacement therapy)     ICD-10-CM: Z79.890  ICD-9-CM: V07.4     OAB (overactive bladder)     ICD-10-CM: N32.81  ICD-9-CM: 596.51       This patient's grade 3 enterocele is a chronic problem with an uncertain prognosis.  She has been fitted with a #6 ring pessary with support and has relief of bulging.  I have recommended that she talk with Dr. Tay about a sacrocolpopexy.  She does not have evidence of a cystocele.  She will continue using Myrbetriq for overactive bladder symptoms.  I have discussed side effects of this medication with her.        Substance History:   reports that she has never smoked. She has never used smokeless tobacco.   reports current alcohol use.   reports no history of drug use.    Substance use counseling is not indicated based on patient history.      PLAN    · Medications prescribed this encounter:  No orders of the defined types were placed in this encounter.  · #6 ring pessary with support  · Urology evaluation for sacrocolpopexy  · Monthly self breast assessment and annual breast imaging  · Myrbetriq, 50 mg by mouth daily  · Estradiol, 0.5 mg by mouth daily  · Calcium, 600 mg/ Vit. D, 400 IU daily; regular weight-bearing exercise  · Follow up: 12 month(s)  *Please note that portions of this documentation may have been completed with a voice recognition program.  Efforts were made to edit this dictation, but occasional words may have been mistranscribed.       This note was electronically signed.    FILPIE Narayan MD  August 3, 2021  10:16 EDT

## 2021-08-06 RX ORDER — BLOOD SUGAR DIAGNOSTIC
STRIP MISCELLANEOUS
Qty: 100 EACH | Refills: 1 | Status: SHIPPED | OUTPATIENT
Start: 2021-08-06 | End: 2021-12-09

## 2021-08-12 ENCOUNTER — PATIENT MESSAGE (OUTPATIENT)
Dept: ENDOCRINOLOGY | Facility: CLINIC | Age: 71
End: 2021-08-12

## 2021-08-13 ENCOUNTER — OFFICE VISIT (OUTPATIENT)
Dept: ENDOCRINOLOGY | Facility: CLINIC | Age: 71
End: 2021-08-13

## 2021-08-13 VITALS
DIASTOLIC BLOOD PRESSURE: 90 MMHG | SYSTOLIC BLOOD PRESSURE: 144 MMHG | WEIGHT: 214 LBS | BODY MASS INDEX: 35.65 KG/M2 | HEIGHT: 65 IN | HEART RATE: 78 BPM | OXYGEN SATURATION: 98 %

## 2021-08-13 DIAGNOSIS — E11.65 TYPE 2 DIABETES MELLITUS WITH HYPERGLYCEMIA, WITHOUT LONG-TERM CURRENT USE OF INSULIN (HCC): Primary | Chronic | ICD-10-CM

## 2021-08-13 DIAGNOSIS — E66.9 CLASS 2 OBESITY WITH BODY MASS INDEX (BMI) OF 35.0 TO 35.9 IN ADULT, UNSPECIFIED OBESITY TYPE, UNSPECIFIED WHETHER SERIOUS COMORBIDITY PRESENT: Chronic | ICD-10-CM

## 2021-08-13 DIAGNOSIS — I10 ESSENTIAL HYPERTENSION: Chronic | ICD-10-CM

## 2021-08-13 LAB
EXPIRATION DATE: NORMAL
EXPIRATION DATE: NORMAL
GLUCOSE BLDC GLUCOMTR-MCNC: 106 MG/DL (ref 70–130)
HBA1C MFR BLD: 6.3 %
Lab: NORMAL
Lab: NORMAL

## 2021-08-13 PROCEDURE — 3044F HG A1C LEVEL LT 7.0%: CPT | Performed by: PHYSICIAN ASSISTANT

## 2021-08-13 PROCEDURE — 99214 OFFICE O/P EST MOD 30 MIN: CPT | Performed by: PHYSICIAN ASSISTANT

## 2021-08-13 PROCEDURE — 83036 HEMOGLOBIN GLYCOSYLATED A1C: CPT | Performed by: PHYSICIAN ASSISTANT

## 2021-08-13 PROCEDURE — 82947 ASSAY GLUCOSE BLOOD QUANT: CPT | Performed by: PHYSICIAN ASSISTANT

## 2021-08-13 RX ORDER — METFORMIN HYDROCHLORIDE 500 MG/1
1000 TABLET, EXTENDED RELEASE ORAL 2 TIMES DAILY
Qty: 360 TABLET | Refills: 3 | Status: SHIPPED | OUTPATIENT
Start: 2021-08-13 | End: 2022-07-11

## 2021-08-13 RX ORDER — GLIPIZIDE 2.5 MG/1
2.5 TABLET, EXTENDED RELEASE ORAL DAILY
Qty: 90 TABLET | Refills: 3 | Status: SHIPPED | OUTPATIENT
Start: 2021-08-13 | End: 2022-06-06

## 2021-08-13 NOTE — PROGRESS NOTES
"     Office Note      Date: 2021  Patient Name: Yulissa Bauer  MRN: [id number]  : 1950    Chief Complaint   Patient presents with   • Diabetes       History of Present Illness:   Yulissa Bauer is a 70 y.o. female who presents today for type 2 diabetes.  Added low-dose glipizide 2 months ago.  She remains on metformin and Ozempic.  She is only tolerating the 0.25 mg dose of Ozempic, constipation on higher dose.  She did use the Tamir 2 CGM for a month or so after last visit.  She is now back to testing 1 time per day.  She reports that blood sugars have improved.  Most fasting readings less than 150.  She denies any hypoglycemia.  She developed Covid-19 in July.  She forgot to take glipizide XL for about 10 days while she was sick.  She was not eating much during this time.  Past diabetic medications include Jardiance (UTIs), Januvia.  Feet: No sores.  Pain in toes at night that she relates to sciatica.  Eye exam current (within one year): yes.      Subjective      Review of Systems:   Review of Systems   Constitutional: Negative.    Cardiovascular: Negative.    Gastrointestinal: Negative.    Endocrine: Negative.        The following portions of the patient's history were reviewed and updated as appropriate: allergies, current medications, past family history, past medical history, past social history, past surgical history and problem list.    Objective     Vitals:    21 1028   BP: 144/90   BP Location: Left arm   Patient Position: Sitting   Cuff Size: Adult   Pulse: 78   SpO2: 98%   Weight: 97.1 kg (214 lb)   Height: 165.1 cm (65\")   PainSc: 0-No pain     Body mass index is 35.61 kg/m².     Physical Exam  Vitals reviewed.   Constitutional:       General: She is not in acute distress.  Neurological:      Mental Status: She is alert and oriented to person, place, and time.   Psychiatric:         Mood and Affect: Affect normal.       HEMOGLOBIN A1C  Lab Results   Component Value Date "    HGBA1C 6.3 08/13/2021    HGBA1C 7.4 05/11/2021    HGBA1C 7.1 12/15/2020     GLUCOSE  Lab Results   Component Value Date    POCGLU 106 08/13/2021       Current Outpatient Medications   Medication Instructions   • acetaminophen (TYLENOL) 1,000 mg, Oral, Every 8 Hours PRN   • amLODIPine (NORVASC) 5 MG tablet No dose, route, or frequency recorded.   • aspirin 81 mg, Oral, Every Other Day   • atenolol (TENORMIN) 25 mg, Oral, Daily   • Blood Glucose Monitoring Suppl (ACCU-CHEK LUÍS PLUS) W/DEVICE kit No dose, route, or frequency recorded.   • estradiol (ESTRACE) 0.5 MG tablet Take 1/2 tablet by mouth every other day   • gabapentin (NEURONTIN) 100 MG capsule 1 capsule, Oral, As Needed   • glipizide (GLUCOTROL XL) 2.5 mg, Oral, Daily   • glucose blood (Accu-Chek Luís Plus) test strip TEST BLOOD DAILY   • linaclotide (LINZESS) 72 MCG capsule capsule Take 1 by mouth on an empty stomach in the morning at least 30 minutes before breakfast twice daily.,   • metFORMIN ER (GLUCOPHAGE-XR) 1,000 mg, Oral, 2 Times Daily   • Mirabegron ER (Myrbetriq) 50 MG tablet sustained-release 24 hour 24 hr tablet TAKE 1 TABLET BY MOUTH EVERY EVENING   • Multiple Vitamins-Minerals (DAILY MULTI VITAMIN/MINERALS PO) Oral       Assessment / Plan      Assessment & Plan:  1. Type 2 diabetes mellitus with hyperglycemia, without long-term current use of insulin (CMS/[facility])  A1c much better!  Weight is down 4 more pounds.  Since she is only tolerating low-dose of Ozempic, we discussed trial off of this.  She will stop Ozempic after she finishes up current pen.  Continue metformin ER and glipizide XL.  Encouraged to keep working on nutritious dietary choices, regular exercise, weight loss.  - POC Glycosylated Hemoglobin (Hb A1C)  - POC Glucose, Blood    2. Essential hypertension  BP elevated today.  This is higher than usual.  Continue current medications.  Continue to monitor.    3. Class 2 obesity with body mass index (BMI) of 35.0 to 35.9 in adult,  unspecified obesity type, unspecified whether serious comorbidity present  Weight trending down.  Encouraged to keep working on nutritious dietary choices and regular exercise.      Return in about 3 months (around 11/13/2021) for Next scheduled follow up.     ODESSA Huitron  Endocrinology  08/13/2021

## 2021-08-15 ENCOUNTER — TELEPHONE (OUTPATIENT)
Dept: ENDOCRINOLOGY | Facility: CLINIC | Age: 71
End: 2021-08-15

## 2021-10-08 ENCOUNTER — TELEPHONE (OUTPATIENT)
Dept: ENDOCRINOLOGY | Facility: CLINIC | Age: 71
End: 2021-10-08

## 2021-10-18 DIAGNOSIS — N32.81 OAB (OVERACTIVE BLADDER): ICD-10-CM

## 2021-10-18 NOTE — TELEPHONE ENCOUNTER
Rx Refill Note  Appointment 8/9/22 with CAMERON Cunningham.  Requested Prescriptions     Pending Prescriptions Disp Refills   • Mirabegron ER (Myrbetriq) 50 MG tablet sustained-release 24 hour 24 hr tablet [Pharmacy Med Name: MYRBETRIQ 50 MG Tablet Extended Release 24 Hour] 90 tablet 3     Sig: TAKE 1 TABLET EVERY EVENING      Last office visit with prescribing clinician: 8/3/2021      Next office visit with prescribing clinician: Visit date not found            Esthela Landa MA  10/18/21, 09:38 EDT

## 2021-10-28 RX ORDER — SEMAGLUTIDE 1.34 MG/ML
0.5 INJECTION, SOLUTION SUBCUTANEOUS WEEKLY
Qty: 1.5 ML | Refills: 5 | Status: SHIPPED | OUTPATIENT
Start: 2021-10-28 | End: 2022-09-19

## 2021-11-09 RX ORDER — SOD SULF/POT CHLORIDE/MAG SULF 1.479 G
1 TABLET ORAL ONCE
Qty: 24 TABLET | Refills: 0 | Status: SHIPPED | OUTPATIENT
Start: 2021-11-09 | End: 2021-11-09

## 2021-11-16 ENCOUNTER — OFFICE VISIT (OUTPATIENT)
Dept: ENDOCRINOLOGY | Facility: CLINIC | Age: 71
End: 2021-11-16

## 2021-11-16 VITALS
OXYGEN SATURATION: 98 % | WEIGHT: 223 LBS | DIASTOLIC BLOOD PRESSURE: 80 MMHG | HEIGHT: 65 IN | SYSTOLIC BLOOD PRESSURE: 138 MMHG | HEART RATE: 83 BPM | BODY MASS INDEX: 37.15 KG/M2

## 2021-11-16 DIAGNOSIS — E11.65 TYPE 2 DIABETES MELLITUS WITH HYPERGLYCEMIA, WITHOUT LONG-TERM CURRENT USE OF INSULIN (HCC): Primary | Chronic | ICD-10-CM

## 2021-11-16 LAB
EXPIRATION DATE: ABNORMAL
EXPIRATION DATE: NORMAL
GLUCOSE BLDC GLUCOMTR-MCNC: 163 MG/DL (ref 70–130)
HBA1C MFR BLD: 6.9 %
Lab: ABNORMAL
Lab: NORMAL

## 2021-11-16 PROCEDURE — 99213 OFFICE O/P EST LOW 20 MIN: CPT | Performed by: PHYSICIAN ASSISTANT

## 2021-11-16 PROCEDURE — 83036 HEMOGLOBIN GLYCOSYLATED A1C: CPT | Performed by: PHYSICIAN ASSISTANT

## 2021-11-16 PROCEDURE — 3044F HG A1C LEVEL LT 7.0%: CPT | Performed by: PHYSICIAN ASSISTANT

## 2021-11-16 PROCEDURE — 82947 ASSAY GLUCOSE BLOOD QUANT: CPT | Performed by: PHYSICIAN ASSISTANT

## 2021-11-19 ENCOUNTER — OUTSIDE FACILITY SERVICE (OUTPATIENT)
Dept: GASTROENTEROLOGY | Facility: CLINIC | Age: 71
End: 2021-11-19

## 2021-11-19 PROCEDURE — 45385 COLONOSCOPY W/LESION REMOVAL: CPT | Performed by: INTERNAL MEDICINE

## 2021-11-19 PROCEDURE — 88305 TISSUE EXAM BY PATHOLOGIST: CPT | Performed by: INTERNAL MEDICINE

## 2021-11-22 ENCOUNTER — LAB REQUISITION (OUTPATIENT)
Dept: LAB | Facility: HOSPITAL | Age: 71
End: 2021-11-22

## 2021-11-22 DIAGNOSIS — K63.5 POLYP OF COLON: ICD-10-CM

## 2021-11-22 DIAGNOSIS — K57.30 DIVERTICULOSIS OF LARGE INTESTINE WITHOUT PERFORATION OR ABSCESS WITHOUT BLEEDING: ICD-10-CM

## 2021-11-22 DIAGNOSIS — Z86.010 PERSONAL HISTORY OF COLONIC POLYPS: ICD-10-CM

## 2021-11-22 DIAGNOSIS — Z12.11 ENCOUNTER FOR SCREENING FOR MALIGNANT NEOPLASM OF COLON: ICD-10-CM

## 2021-11-23 LAB
CYTO UR: NORMAL
LAB AP CASE REPORT: NORMAL
LAB AP CLINICAL INFORMATION: NORMAL
LAB AP DIAGNOSIS COMMENT: NORMAL
PATH REPORT.FINAL DX SPEC: NORMAL
PATH REPORT.GROSS SPEC: NORMAL

## 2021-12-09 RX ORDER — BLOOD SUGAR DIAGNOSTIC
STRIP MISCELLANEOUS
Qty: 100 EACH | Refills: 3 | Status: SHIPPED | OUTPATIENT
Start: 2021-12-09 | End: 2022-03-01

## 2022-03-01 ENCOUNTER — OFFICE VISIT (OUTPATIENT)
Dept: ENDOCRINOLOGY | Facility: CLINIC | Age: 72
End: 2022-03-01

## 2022-03-01 VITALS
OXYGEN SATURATION: 99 % | SYSTOLIC BLOOD PRESSURE: 128 MMHG | HEIGHT: 65 IN | DIASTOLIC BLOOD PRESSURE: 78 MMHG | WEIGHT: 226 LBS | BODY MASS INDEX: 37.65 KG/M2 | HEART RATE: 79 BPM

## 2022-03-01 DIAGNOSIS — E11.65 TYPE 2 DIABETES MELLITUS WITH HYPERGLYCEMIA, WITHOUT LONG-TERM CURRENT USE OF INSULIN: Primary | Chronic | ICD-10-CM

## 2022-03-01 LAB
EXPIRATION DATE: ABNORMAL
EXPIRATION DATE: NORMAL
GLUCOSE BLDC GLUCOMTR-MCNC: 149 MG/DL (ref 70–130)
HBA1C MFR BLD: 6.7 %
Lab: ABNORMAL
Lab: NORMAL

## 2022-03-01 PROCEDURE — 3044F HG A1C LEVEL LT 7.0%: CPT | Performed by: PHYSICIAN ASSISTANT

## 2022-03-01 PROCEDURE — 99213 OFFICE O/P EST LOW 20 MIN: CPT | Performed by: PHYSICIAN ASSISTANT

## 2022-03-01 PROCEDURE — 83036 HEMOGLOBIN GLYCOSYLATED A1C: CPT | Performed by: PHYSICIAN ASSISTANT

## 2022-03-01 PROCEDURE — 82947 ASSAY GLUCOSE BLOOD QUANT: CPT | Performed by: PHYSICIAN ASSISTANT

## 2022-03-01 RX ORDER — BLOOD SUGAR DIAGNOSTIC
STRIP MISCELLANEOUS
Qty: 200 EACH | Refills: 3 | Status: SHIPPED | OUTPATIENT
Start: 2022-03-01

## 2022-03-01 RX ORDER — GABAPENTIN 300 MG/1
300 CAPSULE ORAL NIGHTLY
COMMUNITY

## 2022-03-01 NOTE — PROGRESS NOTES
"     Office Note      Date: 2022  Patient Name: Yulissa Bauer  MRN: [id number]  : 1950    Chief Complaint   Patient presents with   • Diabetes       History of Present Illness:   Yulissa Bauer is a 71 y.o. female who presents today for follow up on type 2 diabetes.  Current diabetic medications include metformin, Ozempic, and glipizide XL.  Past diabetic medications include Jardiance (UTIs), Januvia.  She is testing blood sugars once per day.  Recent fasting blood sugars typically 130-160.  She denies any hypoglycemia.  Current diet: Room for improvement.  Current exercise: None.  Feet: No sores. She has intermittent pain in toes and tingling in feet.  She has received several epidurals.  Eye exam current (within one year): yes, , Dr. Teagan Erazo. No retinopathy.  ACE inhibitor/ARB: No.  Statin: No.  She had laparoscopic colpopexy in December.  She had lithotripsy in January, then additional procedure 1 month ago.  Routine labs up to date with PCP.    Subjective      Review of Systems:   Review of Systems   Constitutional: Negative.    Cardiovascular: Negative.    Gastrointestinal: Positive for constipation (controlled).   Endocrine: Negative.        The following portions of the patient's history were reviewed and updated as appropriate: allergies, current medications, past family history, past medical history, past social history, past surgical history and problem list.    Objective     Vitals:    22 1037   BP: 128/78   BP Location: Left arm   Patient Position: Sitting   Cuff Size: Adult   Pulse: 79   SpO2: 99%   Weight: 103 kg (226 lb)   Height: 165.1 cm (65\")   PainSc: 0-No pain     Body mass index is 37.61 kg/m².    Physical Exam  Vitals reviewed.   Constitutional:       General: She is not in acute distress.  Neurological:      Mental Status: She is alert and oriented to person, place, and time.   Psychiatric:         Mood and Affect: Affect normal.         HEMOGLOBIN " A1C  Lab Results   Component Value Date    HGBA1C 6.7 03/01/2022    HGBA1C 6.9 11/16/2021    HGBA1C 6.3 08/13/2021     GLUCOSE  Lab Results   Component Value Date    POCGLU 149 (A) 03/01/2022       Current Outpatient Medications   Medication Instructions   • Accu-Chek Rolanda Plus test strip Testing 2 times per day; E11.65   • acetaminophen (TYLENOL) 1,000 mg, Oral, Every 8 Hours PRN   • amLODIPine (NORVASC) 5 MG tablet No dose, route, or frequency recorded.   • aspirin 81 mg, Oral, Every Other Day   • atenolol (TENORMIN) 25 mg, Oral, Daily   • Blood Glucose Monitoring Suppl (ACCU-CHEK ROLANDA PLUS) W/DEVICE kit No dose, route, or frequency recorded.   • estradiol (ESTRACE) 0.5 MG tablet Take 1/2 tablet by mouth every other day   • gabapentin (NEURONTIN) 300 mg, Oral, Nightly   • glipizide (GLUCOTROL XL) 2.5 mg, Oral, Daily   • metFORMIN ER (GLUCOPHAGE-XR) 1,000 mg, Oral, 2 Times Daily   • Mirabegron ER (Myrbetriq) 50 MG tablet sustained-release 24 hour 24 hr tablet TAKE 1 TABLET EVERY EVENING   • Multiple Vitamins-Minerals (DAILY MULTI VITAMIN/MINERALS PO) Oral   • Ozempic (0.25 or 0.5 MG/DOSE) 0.5 mg, Subcutaneous, Weekly       Assessment / Plan      Assessment & Plan:  1. Type 2 diabetes mellitus with hyperglycemia, without long-term current use of insulin (HCC)  A1c improved, at goal.  Reviewed BG log.  No known hypoglycemia.  She will continue Ozempic, metformin ER, and glipizide.  Encouraged nutritious dietary choices and regular exercise.  BP okay.  - POC Glucose, Blood  - POC Glycosylated Hemoglobin (Hb A1C)      Return in about 3 months (around 6/1/2022) for recheck with A1c. She was advised to contact the office with any interval questions or concerns.    ODESSA Huitron  Endocrinology  03/01/2022

## 2022-06-03 ENCOUNTER — TRANSCRIBE ORDERS (OUTPATIENT)
Dept: ADMINISTRATIVE | Facility: HOSPITAL | Age: 72
End: 2022-06-03

## 2022-06-03 DIAGNOSIS — Z12.31 ENCOUNTER FOR SCREENING MAMMOGRAM FOR BREAST CANCER: Primary | ICD-10-CM

## 2022-06-04 DIAGNOSIS — E11.65 TYPE 2 DIABETES MELLITUS WITH HYPERGLYCEMIA, WITHOUT LONG-TERM CURRENT USE OF INSULIN: Chronic | ICD-10-CM

## 2022-06-06 RX ORDER — GLIPIZIDE 2.5 MG/1
TABLET, EXTENDED RELEASE ORAL
Qty: 90 TABLET | Refills: 0 | Status: SHIPPED | OUTPATIENT
Start: 2022-06-06 | End: 2022-08-04 | Stop reason: SDUPTHER

## 2022-07-01 ENCOUNTER — HOSPITAL ENCOUNTER (OUTPATIENT)
Dept: MAMMOGRAPHY | Facility: HOSPITAL | Age: 72
Discharge: HOME | End: 2022-07-01
Admitting: INTERNAL MEDICINE

## 2022-07-01 DIAGNOSIS — Z12.31 ENCOUNTER FOR SCREENING MAMMOGRAM FOR BREAST CANCER: ICD-10-CM

## 2022-07-01 PROCEDURE — 77063 BREAST TOMOSYNTHESIS BI: CPT | Performed by: RADIOLOGY

## 2022-07-01 PROCEDURE — 77063 BREAST TOMOSYNTHESIS BI: CPT

## 2022-07-01 PROCEDURE — 77067 SCR MAMMO BI INCL CAD: CPT | Performed by: RADIOLOGY

## 2022-07-01 PROCEDURE — 77067 SCR MAMMO BI INCL CAD: CPT

## 2022-07-09 DIAGNOSIS — E11.65 TYPE 2 DIABETES MELLITUS WITH HYPERGLYCEMIA, WITHOUT LONG-TERM CURRENT USE OF INSULIN: Chronic | ICD-10-CM

## 2022-07-11 RX ORDER — METFORMIN HYDROCHLORIDE 500 MG/1
1000 TABLET, EXTENDED RELEASE ORAL 2 TIMES DAILY
Qty: 360 TABLET | Refills: 3 | Status: SHIPPED | OUTPATIENT
Start: 2022-07-11

## 2022-07-18 ENCOUNTER — PATIENT MESSAGE (OUTPATIENT)
Dept: ENDOCRINOLOGY | Facility: CLINIC | Age: 72
End: 2022-07-18

## 2022-07-19 ENCOUNTER — OFFICE VISIT (OUTPATIENT)
Dept: ENDOCRINOLOGY | Facility: CLINIC | Age: 72
End: 2022-07-19

## 2022-07-19 VITALS
BODY MASS INDEX: 38.32 KG/M2 | DIASTOLIC BLOOD PRESSURE: 74 MMHG | WEIGHT: 230 LBS | SYSTOLIC BLOOD PRESSURE: 122 MMHG | HEIGHT: 65 IN | OXYGEN SATURATION: 97 % | HEART RATE: 83 BPM

## 2022-07-19 DIAGNOSIS — E11.65 TYPE 2 DIABETES MELLITUS WITH HYPERGLYCEMIA, WITHOUT LONG-TERM CURRENT USE OF INSULIN: Primary | Chronic | ICD-10-CM

## 2022-07-19 LAB
EXPIRATION DATE: ABNORMAL
EXPIRATION DATE: NORMAL
GLUCOSE BLDC GLUCOMTR-MCNC: 144 MG/DL (ref 70–130)
HBA1C MFR BLD: 6.7 %
Lab: ABNORMAL
Lab: NORMAL

## 2022-07-19 PROCEDURE — 3044F HG A1C LEVEL LT 7.0%: CPT | Performed by: PHYSICIAN ASSISTANT

## 2022-07-19 PROCEDURE — 83036 HEMOGLOBIN GLYCOSYLATED A1C: CPT | Performed by: PHYSICIAN ASSISTANT

## 2022-07-19 PROCEDURE — 99213 OFFICE O/P EST LOW 20 MIN: CPT | Performed by: PHYSICIAN ASSISTANT

## 2022-07-19 PROCEDURE — 82947 ASSAY GLUCOSE BLOOD QUANT: CPT | Performed by: PHYSICIAN ASSISTANT

## 2022-07-19 NOTE — PROGRESS NOTES
Office Note      Date: 2022  Patient Name: Yulissa Bauer  MRN: [id number]  : 1950    Chief Complaint   Patient presents with   • Diabetes     History of Present Illness:   Yulissa Bauer is a 71 y.o. female who presents today for follow up on type 2 diabetes.  Current diabetic medications include metformin, Ozempic, and glipizide XL.  Past diabetic medications include Jardiance (UTIs), Januvia.  She is testing fasting blood sugars daily.  Readings have been 140s-160s, occasionally in the 170s recently.  She reports increased pain in back and knees.  She has been less active.  She denies any hypoglycemia.  She asks about increasing glipizide.  Current exercise: None.  Feet: No sores.  Prickly/tingling sensations in feet/toes at night, feels like something is on the bottom of her feet.  Symptoms do not occur during the day.  She is taking gabapentin earlier in evening before bed, she does not notice these symptoms.  Last eye exam: ~2021, Dr. Teagan Erazo. No retinopathy.  ACE inhibitor/ARB: No.  Statin: No.    Subjective      Review of Systems   Constitutional: Negative.   Cardiovascular: Negative.    Endocrine: Negative.    Musculoskeletal: Positive for back pain and joint pain.   Gastrointestinal: Negative.    Neurological: Positive for paresthesias.       Past Medical History:   Diagnosis Date   • Benign essential hypertension    • Carcinoma in situ of skin of chest wall    • History of nephrolithiasis    • Hormone replacement therapy (HRT)    • Hyperparathyroidism (HCC)    • Hypertension    • Hypertriglyceridemia    • Injury of left foot     Tendon injury left foot-Abstracted from [employer]   • Inverse psoriasis    • Menopause    • Obesity    • Personal history of kidney cancer    • Primary hyperparathyroidism (HCC)    • Rectocele    • Sciatica    • Type 2 diabetes mellitus (HCC)    • Urine incontinence    • Uterine prolapse    • Vitamin D deficiency years ago      Past Surgical  "History:   Procedure Laterality Date   • PARATHYROIDECTOMY  06/2006   • BREAST CYST ASPIRATION Left    • COLONOSCOPY     • COLPORRHAPHY      posterior   • DILATATION AND CURETTAGE     • HYSTEROSCOPY      D&C X2   • LAPAROSCOPIC PARTIAL NEPHRECTOMY      Renal cell carcinoma   • LIPOSUCTION     • LUMBAR FUSION     • OOPHORECTOMY Bilateral ~ 2012   • OTHER SURGICAL HISTORY      Lithotripsy   • PELVIC LAPAROSCOPY     • SPINAL FUSION      L3 and L4 due to sciatica   • TOTAL LAPAROSCOPIC HYSTERECTOMY SALPINGO OOPHORECTOMY Bilateral ~ 2012   • TUBAL ABDOMINAL LIGATION       The following portions of the patient's history were reviewed and updated as appropriate: allergies, current medications, past family history, past medical history, past social history, past surgical history and problem list.    Objective     Vitals:    07/19/22 1137   BP: 122/74   Pulse: 83   SpO2: 97%   Weight: 104 kg (230 lb)   Height: 165.1 cm (65\")   Body mass index is 38.27 kg/m².    Physical Exam  Vitals reviewed.   Constitutional:       General: She is not in acute distress.  Cardiovascular:      Pulses:           Dorsalis pedis pulses are 2+ on the right side and 2+ on the left side.        Posterior tibial pulses are 2+ on the right side and 2+ on the left side.   Musculoskeletal:      Right lower leg: Edema present.      Left lower leg: Edema present.      Right foot: Deformity (pes planus) present.      Left foot: Deformity (pes planus) present.   Feet:      Right foot:      Protective Sensation: 8 sites tested. 8 sites sensed.      Skin integrity: Dry skin present. No ulcer or skin breakdown.      Toenail Condition: Right toenails are normal.      Left foot:      Protective Sensation: 8 sites tested. 8 sites sensed.      Skin integrity: Dry skin present. No ulcer or skin breakdown.      Toenail Condition: Left toenails are normal.      Comments: Vibration sensation intact.  Diabetic Foot Exam Performed and Monofilament Test " Performed  Neurological:      Mental Status: She is alert and oriented to person, place, and time.   Psychiatric:         Mood and Affect: Affect normal.         HEMOGLOBIN A1C  Lab Results   Component Value Date    HGBA1C 6.7 07/19/2022    HGBA1C 6.7 03/01/2022    HGBA1C 6.9 11/16/2021     GLUCOSE  Lab Results   Component Value Date    POCGLU 144 (A) 07/19/2022       Current Outpatient Medications   Medication Instructions   • Accu-Chek Rolanda Plus test strip Testing 2 times per day; E11.65   • acetaminophen (TYLENOL) 1,000 mg, Oral, Every 8 Hours PRN   • amLODIPine (NORVASC) 5 MG tablet No dose, route, or frequency recorded.   • aspirin 81 mg, Oral, Every Other Day   • atenolol (TENORMIN) 25 mg, Oral, Daily   • Blood Glucose Monitoring Suppl (ACCU-CHEK ROLANDA PLUS) W/DEVICE kit No dose, route, or frequency recorded.   • estradiol (ESTRACE) 0.5 MG tablet Take 1/2 tablet by mouth every other day   • gabapentin (NEURONTIN) 300 mg, Oral, Nightly   • glipizide (GLUCOTROL XL) 2.5 MG 24 hr tablet TAKE 1 TABLET EVERY DAY   • metFORMIN ER (GLUCOPHAGE-XR) 1,000 mg, Oral, 2 Times Daily   • Mirabegron ER (Myrbetriq) 50 MG tablet sustained-release 24 hour 24 hr tablet TAKE 1 TABLET EVERY EVENING   • Multiple Vitamins-Minerals (DAILY MULTI VITAMIN/MINERALS PO) Oral   • Ozempic (0.25 or 0.5 MG/DOSE) 0.5 mg, Subcutaneous, Weekly       Assessment / Plan      Assessment & Plan:  1. Type 2 diabetes mellitus with hyperglycemia, without long-term current use of insulin (HCC)  A1c at goal.  Recommend to continue current medications for now.  Advised to check some blood sugars later in the day.  She will send me readings.  Consider increasing glipizide.  BP okay.  She may have developed diabetic peripheral neuropathy.  Gabapentin had been initiated for sciatica.  - POC Glucose, Blood  - POC Glycosylated Hemoglobin (Hb A1C)      Return in about 3 months (around 10/19/2022) for next scheduled follow up. She was advised to contact the office  with any interval questions or concerns.    ODESSA Huitron  Endocrinology  07/19/2022

## 2022-08-01 ENCOUNTER — PATIENT MESSAGE (OUTPATIENT)
Dept: ENDOCRINOLOGY | Facility: CLINIC | Age: 72
End: 2022-08-01

## 2022-08-01 DIAGNOSIS — E11.65 TYPE 2 DIABETES MELLITUS WITH HYPERGLYCEMIA, WITHOUT LONG-TERM CURRENT USE OF INSULIN: Chronic | ICD-10-CM

## 2022-08-04 RX ORDER — GLIPIZIDE 2.5 MG/1
5 TABLET, EXTENDED RELEASE ORAL DAILY
Qty: 180 TABLET | Refills: 1 | Status: SHIPPED | OUTPATIENT
Start: 2022-08-04 | End: 2022-12-28

## 2022-08-09 ENCOUNTER — OFFICE VISIT (OUTPATIENT)
Dept: OBSTETRICS AND GYNECOLOGY | Facility: CLINIC | Age: 72
End: 2022-08-09

## 2022-08-09 VITALS
DIASTOLIC BLOOD PRESSURE: 80 MMHG | BODY MASS INDEX: 37.61 KG/M2 | SYSTOLIC BLOOD PRESSURE: 130 MMHG | WEIGHT: 226 LBS | RESPIRATION RATE: 16 BRPM

## 2022-08-09 DIAGNOSIS — N32.81 OAB (OVERACTIVE BLADDER): ICD-10-CM

## 2022-08-09 DIAGNOSIS — Z79.890 HORMONE REPLACEMENT THERAPY: ICD-10-CM

## 2022-08-09 DIAGNOSIS — Z01.419 ENCOUNTER FOR GYNECOLOGICAL EXAMINATION WITHOUT ABNORMAL FINDING: Primary | ICD-10-CM

## 2022-08-09 PROCEDURE — G0101 CA SCREEN;PELVIC/BREAST EXAM: HCPCS | Performed by: NURSE PRACTITIONER

## 2022-08-09 RX ORDER — ESTRADIOL 0.5 MG/1
TABLET ORAL
Qty: 45 TABLET | Refills: 3 | Status: SHIPPED | OUTPATIENT
Start: 2022-08-09

## 2022-08-09 RX ORDER — SODIUM FLUORIDE AND POTASSIUM NITRATE 5.8; 57.5 MG/ML; MG/ML
GEL, DENTIFRICE DENTAL
COMMUNITY
Start: 2022-08-08

## 2022-08-09 NOTE — PROGRESS NOTES
Annual Visit     Patient Name: Yulissa Bauer  : 1950   MRN: [id number]   Care Team: Patient Care Team:  Schumer, Barry, MD as PCP - Internal Medicine  Scott Narayan MD (Inactive) as Consulting Physician (Gynecology)  Génesis Ferrer PA as Physician Assistant (Endocrinology)    Chief Complaint:    Annual exam     HPI: Yulissa Bauer is a 71 y.o. year old  presenting to be seen for her gynecologic exam.   S/p total hyst with BSO d/t uterine prolapse and rectocele     Enterocele noted on exam last yr with Dr. Narayan   She had mesh repair done with Dr. Tay in 2022 - doing very well after surgery   Is able to have a BM now without difficulty   She has felt ? Mesh in vagina - mainly with standing or straining - no vaginal pain or bldg and still feels great improvement since surgery done   She was using a pessary prior to surgery and no longer needs it      Hx OAB - doing well with Myrbetriq 50mg qd - needs refills     Taking estrace 0.5mg - states she tried stopping it for a couple of wks and developed severe joint pain and skin changes so she restarted   Takes it qod and feeling well - would like to continue taking it     Mammogram 2022 birads 1     DEXA 2017 WNL - will have a f/u in august with Dr. 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 Narayan since Dr. Merchant retired   She has 3 children and 7 grandchildren       Subjective      /80   Resp 16   Wt 103 kg (226 lb)   LMP  (LMP Unknown) Comment: LMP PM ~ PRIOR TO AGE 61  Breastfeeding No   BMI 37.61 kg/m²     BMI reviewed: Body mass index is 37.61 kg/m².      Objective     Physical Exam    Neuro: alert and oriented to person, place and time   General:  alert; cooperative; well developed; well nourished   Skin:  No  suspicious lesions seen   Thyroid: normal to inspection and palpation   Lungs:  breathing is unlabored  clear to auscultation bilaterally   Heart:  regular rate and rhythm, S1, S2 normal, no murmur, click, rub or gallop  normal apical impulse   Breasts:  Examined in supine position  Symmetric without masses or skin dimpling  Nipples normal without inversion, lesions or discharge  There are no palpable axillary nodes   Abdomen: soft, non-tender; no masses  no umbilical or inguinal hernias are present  no hepato-splenomegaly   Pelvis: Clinical staff was present for exam  External genitalia:  normal appearance of the external genitalia including Bartholin's and Hurdsfield's glands.  :  urethral meatus normal;  Vaginal:  normal pink mucosa without prolapse or lesions. vaginal mesh not visible on exam but palpated at vaginal apex   Cervix:  absent.  Uterus:  absent.  Adnexa:  absent, bilateral.  Rectal:  digital rectal exam not performed; anus visually normal appearing.         Assessment / Plan      Assessment  Problems Addressed This Visit    ICD-10-CM ICD-9-CM   1. Encounter for gynecological examination without abnormal finding  Z01.419 V72.31   2. OAB (overactive bladder)  N32.81 596.51   3. Hormone replacement therapy  Z79.890 V07.4       Plan    Screenings UTD - will have DEXA with PCP later this month   Discussed Calcium, 600 mg/ Vit. D, 400 IU daily    Discussed vaginal mesh is palpable on exam - if she remains asymptomatic will cont to monitor annually   If pain or bldg develop, she will RTC     Doing well with Myrbetriq 50mg qd - refills to pharmacy     Discussed risks of cont ERT including blood clot, stroke, and MI - she v/u and desires to cont with estradiol   Will cont with Estrace 0.5mg qod - refills to pharmacy     AV 1 yr             Follow Up  Return in about 1 year (around 8/9/2023) for Annual physical.  Patient was given instructions and counseling regarding her condition or for health maintenance  advice. Please see specific information pulled into the AVS if appropriate.     Ernestina Hoyt, APRN  August 9, 2022  12:13 EDT

## 2022-09-19 RX ORDER — SEMAGLUTIDE 1.34 MG/ML
INJECTION, SOLUTION SUBCUTANEOUS
Qty: 1.5 ML | Refills: 5 | Status: SHIPPED | OUTPATIENT
Start: 2022-09-19 | End: 2022-09-26 | Stop reason: SDUPTHER

## 2022-09-26 RX ORDER — SEMAGLUTIDE 1.34 MG/ML
0.5 INJECTION, SOLUTION SUBCUTANEOUS WEEKLY
Qty: 4.5 ML | Refills: 1 | Status: SHIPPED | OUTPATIENT
Start: 2022-09-26

## 2022-10-31 ENCOUNTER — PATIENT MESSAGE (OUTPATIENT)
Dept: ENDOCRINOLOGY | Facility: CLINIC | Age: 72
End: 2022-10-31

## 2022-11-01 ENCOUNTER — OFFICE VISIT (OUTPATIENT)
Dept: ENDOCRINOLOGY | Facility: CLINIC | Age: 72
End: 2022-11-01
Payer: MEDICARE

## 2022-11-01 VITALS
HEART RATE: 88 BPM | OXYGEN SATURATION: 96 % | SYSTOLIC BLOOD PRESSURE: 158 MMHG | DIASTOLIC BLOOD PRESSURE: 82 MMHG | WEIGHT: 228.4 LBS | HEIGHT: 65 IN | BODY MASS INDEX: 38.05 KG/M2

## 2022-11-01 DIAGNOSIS — E83.52 HYPERCALCEMIA: ICD-10-CM

## 2022-11-01 DIAGNOSIS — E11.65 TYPE 2 DIABETES MELLITUS WITH HYPERGLYCEMIA, WITHOUT LONG-TERM CURRENT USE OF INSULIN: Primary | Chronic | ICD-10-CM

## 2022-11-01 PROCEDURE — 99213 OFFICE O/P EST LOW 20 MIN: CPT | Performed by: PHYSICIAN ASSISTANT

## 2022-11-02 LAB
CA-I SERPL ISE-MCNC: 5.3 MG/DL (ref 4.5–5.6)
CALCIUM SERPL-MCNC: 9.4 MG/DL (ref 8.7–10.3)
INTACT PTH: NORMAL
PTH-INTACT SERPL-MCNC: 28 PG/ML (ref 15–65)

## 2022-12-28 DIAGNOSIS — E11.65 TYPE 2 DIABETES MELLITUS WITH HYPERGLYCEMIA, WITHOUT LONG-TERM CURRENT USE OF INSULIN: Chronic | ICD-10-CM

## 2022-12-28 RX ORDER — GLIPIZIDE 2.5 MG/1
TABLET, EXTENDED RELEASE ORAL
Qty: 180 TABLET | Refills: 1 | Status: SHIPPED | OUTPATIENT
Start: 2022-12-28

## 2023-02-24 ENCOUNTER — OFFICE VISIT (OUTPATIENT)
Dept: ENDOCRINOLOGY | Facility: CLINIC | Age: 73
End: 2023-02-24
Payer: MEDICARE

## 2023-02-24 ENCOUNTER — TELEPHONE (OUTPATIENT)
Dept: ENDOCRINOLOGY | Facility: CLINIC | Age: 73
End: 2023-02-24

## 2023-02-24 VITALS
DIASTOLIC BLOOD PRESSURE: 72 MMHG | SYSTOLIC BLOOD PRESSURE: 140 MMHG | HEART RATE: 68 BPM | WEIGHT: 228 LBS | OXYGEN SATURATION: 99 % | BODY MASS INDEX: 37.99 KG/M2 | HEIGHT: 65 IN

## 2023-02-24 DIAGNOSIS — E11.65 TYPE 2 DIABETES MELLITUS WITH HYPERGLYCEMIA, WITHOUT LONG-TERM CURRENT USE OF INSULIN: Primary | Chronic | ICD-10-CM

## 2023-02-24 LAB
EXPIRATION DATE: ABNORMAL
EXPIRATION DATE: NORMAL
GLUCOSE BLDC GLUCOMTR-MCNC: 158 MG/DL (ref 70–130)
HBA1C MFR BLD: 6.9 %
Lab: ABNORMAL
Lab: NORMAL

## 2023-02-24 PROCEDURE — 82947 ASSAY GLUCOSE BLOOD QUANT: CPT | Performed by: PHYSICIAN ASSISTANT

## 2023-02-24 PROCEDURE — 83036 HEMOGLOBIN GLYCOSYLATED A1C: CPT | Performed by: PHYSICIAN ASSISTANT

## 2023-02-24 PROCEDURE — 99213 OFFICE O/P EST LOW 20 MIN: CPT | Performed by: PHYSICIAN ASSISTANT

## 2023-02-24 PROCEDURE — 3044F HG A1C LEVEL LT 7.0%: CPT | Performed by: PHYSICIAN ASSISTANT

## 2023-02-24 NOTE — PROGRESS NOTES
Office Note      Date: 2023  Patient Name: Yulissa Bauer  MRN: [id number]  : 1950    Chief Complaint   Patient presents with   • Diabetes       History of Present Illness  Yulissa Bauer is a 72 y.o. female who presents today for follow up on type 2 diabetes.   Known diabetic complications: peripheral neuropathy.  Current diabetic medications: metformin, Ozempic, and glipizide XL.  Past diabetic medications: Jardiance (UTIs), Januvia.  Testing fasting blood sugars daily.  Most readings 130-150 range.  No known hypoglycemia.  She did note hyperglycemia over 200 following steroid injection.  Feet: No sores.  She takes gabapentin for neuropathy symptoms at night.  Foot exam up to date.  Last eye exam: ~2022. Dr. Teagan Erazo. No retinopathy. Cataracts.  ACE inhibitor/ARB: No.  Statin: No.  She has history of primary hyperparathyroidism, s/p parathyroidectomy in .  Calcium had been mildly elevated at 10.5 on labs.  Repeat calcium was normal at 9.4, ionized calcium normal, and normal intact PTH of 28.  She reports being diagnosed with squamous cell skin cancer left forearm.      Review of Systems   Constitutional: Negative.    Cardiovascular: Negative.    Gastrointestinal: Negative.    Endocrine: Negative.      Past Medical History:   Diagnosis Date   • Benign essential hypertension    • Carcinoma in situ of skin of chest wall    • History of nephrolithiasis    • Hormone replacement therapy (HRT)    • Hypertension    • Hypertriglyceridemia    • Injury of left foot     Tendon injury left foot-Abstracted from [employer]   • Inverse psoriasis    • Menopause    • Obesity    • Personal history of kidney cancer    • Primary hyperparathyroidism (HCC)    • Rectocele    • Sciatica    • Type 2 diabetes mellitus (HCC)    • Urine incontinence    • Uterine prolapse    • Vitamin D deficiency years ago      Past Surgical History:   Procedure Laterality Date   • PARATHYROIDECTOMY  2006   • BREAST  "CYST ASPIRATION Left    • COLONOSCOPY     • COLPORRHAPHY      posterior   • DILATATION AND CURETTAGE     • HYSTEROSCOPY      D&C X2   • LAPAROSCOPIC PARTIAL NEPHRECTOMY      Renal cell carcinoma   • LIPOSUCTION     • LUMBAR FUSION     • OOPHORECTOMY Bilateral ~ 2012   • OTHER SURGICAL HISTORY      Lithotripsy   • PELVIC LAPAROSCOPY     • SPINAL FUSION      L3 and L4 due to sciatica   • TOTAL LAPAROSCOPIC HYSTERECTOMY SALPINGO OOPHORECTOMY Bilateral ~ 2012   • TUBAL ABDOMINAL LIGATION       The following portions of the patient's history were reviewed and updated as appropriate: allergies, current medications, past family history, past medical history, past social history, past surgical history and problem list.    Vitals:  /72 (BP Location: Left arm, Patient Position: Sitting, Cuff Size: Adult)   Pulse 68   Ht 165.1 cm (65\")   Wt 103 kg (228 lb)   LMP  (LMP Unknown) Comment: LMP PM ~ PRIOR TO AGE 61  SpO2 99%   BMI 37.94 kg/m²     Physical Exam  Vitals reviewed.   Constitutional:       General: She is not in acute distress.  Neurological:      Mental Status: She is alert and oriented to person, place, and time.   Psychiatric:         Mood and Affect: Affect normal.       Labs/Imaging    Hemoglobin A1c  Lab Results   Component Value Date    HGBA1C 6.9 02/24/2023    HGBA1C 6.7 07/19/2022    HGBA1C 6.7 03/01/2022     Glucose  Lab Results   Component Value Date    POCGLU 158 (A) 02/24/2023       Current Outpatient Medications   Medication Instructions   • Accu-Chek Rolanda Plus test strip Testing 2 times per day; E11.65   • acetaminophen (TYLENOL) 1,000 mg, Oral, Every 8 Hours PRN   • amLODIPine (NORVASC) 5 MG tablet No dose, route, or frequency recorded.   • aspirin 81 mg, Oral, Every Other Day   • atenolol (TENORMIN) 25 mg, Oral, Daily   • Blood Glucose Monitoring Suppl (ACCU-CHEK ROLANDA PLUS) W/DEVICE kit No dose, route, or frequency recorded.   • estradiol (ESTRACE) 0.5 MG tablet Take 1 tablet by mouth " every other day   • gabapentin (NEURONTIN) 300 mg, Oral, Nightly   • glipizide (GLUCOTROL XL) 2.5 MG 24 hr tablet TAKE 2 TABLETS EVERY DAY   • metFORMIN ER (GLUCOPHAGE-XR) 1,000 mg, Oral, 2 Times Daily   • Mirabegron ER (Myrbetriq) 50 MG tablet sustained-release 24 hour 24 hr tablet TAKE 1 TABLET EVERY EVENING   • Multiple Vitamins-Minerals (DAILY MULTI VITAMIN/MINERALS PO) Oral   • Ozempic (0.25 or 0.5 MG/DOSE) 0.5 mg, Subcutaneous, Weekly   • PreviDent 5000 Sensitive 1.1-5 % gel No dose, route, or frequency recorded.       Assessment & Plan  1. Type 2 diabetes mellitus with hyperglycemia, without long-term current use of insulin (HCC)  A1c at goal.  Continue Ozempic, metformin, glipizide XL.  Encouraged nutritious dietary choices, moderation of carbohydrates, regular exercise and weight loss.  Labs up to date with PCP.  - POC Glucose, Blood  - POC Glycosylated Hemoglobin (Hb A1C)      Return in about 3 months (around 5/24/2023) for next scheduled follow up. She was advised to contact the office with any interval questions or concerns.    ODESSA Huitron  Endocrinology  02/24/2023

## 2023-02-24 NOTE — TELEPHONE ENCOUNTER
----- Message from ODESSA Boateng sent at 2/24/2023  9:21 AM EST -----  Regarding: Eye exam  Please call for copy of eye exam (Nov or Dec 2022) from Dr. Teagan Erazo.  Thank you!

## 2023-04-25 NOTE — TELEPHONE ENCOUNTER
Dx Code:  E11.65.  Refill Accu-Chek Rolanda Plus test strip.  Refill to [facility].  Last office visit 02/24/23.  Next scheduled appt 05/30/23

## 2023-05-29 ENCOUNTER — PATIENT MESSAGE (OUTPATIENT)
Dept: ENDOCRINOLOGY | Facility: CLINIC | Age: 73
End: 2023-05-29

## 2023-05-30 ENCOUNTER — OFFICE VISIT (OUTPATIENT)
Dept: ENDOCRINOLOGY | Facility: CLINIC | Age: 73
End: 2023-05-30

## 2023-05-30 VITALS
DIASTOLIC BLOOD PRESSURE: 73 MMHG | BODY MASS INDEX: 38.32 KG/M2 | SYSTOLIC BLOOD PRESSURE: 124 MMHG | HEIGHT: 65 IN | HEART RATE: 88 BPM | OXYGEN SATURATION: 98 % | WEIGHT: 230 LBS

## 2023-05-30 DIAGNOSIS — E11.65 TYPE 2 DIABETES MELLITUS WITH HYPERGLYCEMIA, WITHOUT LONG-TERM CURRENT USE OF INSULIN: Primary | Chronic | ICD-10-CM

## 2023-05-30 LAB
EXPIRATION DATE: ABNORMAL
EXPIRATION DATE: NORMAL
GLUCOSE BLDC GLUCOMTR-MCNC: 138 MG/DL (ref 70–130)
HBA1C MFR BLD: 7 %
Lab: ABNORMAL
Lab: NORMAL

## 2023-05-30 NOTE — ASSESSMENT & PLAN NOTE
A1c at goal, but is creeping back up at 7.0% today.  She would like to try increasing Ozempic to 0.5 mg weekly again.  Continue metformin ER and glipizide XL.  Consider Mounjaro next visit.  Encouraged regular exercise.  Encouraged nutritious dietary choices, moderation of carbohydrates, avoidance of added sugar, weight loss.  Diabetes will be reassessed in 3 months.

## 2023-05-30 NOTE — PROGRESS NOTES
Office Note      Date: 2023  Patient Name: Yulissa Bauer  MRN: [id number]  : 1950    Chief Complaint   Patient presents with   • Diabetes       History of Present Illness  Yulissa Bauer is a 72 y.o. female who presents today for follow up on type 2 diabetes.   Diabetes was diagnosed in .  Known diabetic complications: peripheral neuropathy.  Current diabetic medications: Ozempic, metformin ER, glipizide XL.  Past diabetic medications: Jardiance (UTIs), Januvia.  She is testing blood sugars once daily.  Fasting readings typically 140-160 range.  Lowest has been 117.  She denies any hypoglycemia.  She does feel like appetite is decreased on low-dose of Ozempic.  She is not exercising regularly.  Feet: No sores or new issues.  Tingling sensations, mainly notices at night.  She remains on gabapentin in the evening for neuropathy symptoms.  Foot exam up to date.  Last eye exam: 2022. Dr. Teagan Erazo.  No retinopathy.  ACE inhibitor/ARB: No.  Statin: No.    Review of systems  As noted in HPI.    Past Medical History:   Diagnosis Date   • Benign essential hypertension    • Carcinoma in situ of skin of chest wall    • History of nephrolithiasis    • Hormone replacement therapy (HRT)    • Hypertension    • Hypertriglyceridemia    • Injury of left foot     Tendon injury left foot-Abstracted from [employer]   • Inverse psoriasis    • Menopause    • Obesity    • Personal history of kidney cancer    • Primary hyperparathyroidism    • Rectocele    • Sciatica    • Type 2 diabetes mellitus    • Urine incontinence    • Uterine prolapse    • Vitamin D deficiency years ago      Past Surgical History:   Procedure Laterality Date   • PARATHYROIDECTOMY  2006   • BREAST CYST ASPIRATION Left    • COLONOSCOPY     • COLPORRHAPHY      posterior   • DILATATION AND CURETTAGE     • HYSTEROSCOPY      D&C X2   • LAPAROSCOPIC PARTIAL NEPHRECTOMY      Renal cell carcinoma   • LIPOSUCTION     • LUMBAR  "FUSION     • OOPHORECTOMY Bilateral ~ 2012   • OTHER SURGICAL HISTORY      Lithotripsy   • PELVIC LAPAROSCOPY     • SPINAL FUSION      L3 and L4 due to sciatica   • TOTAL LAPAROSCOPIC HYSTERECTOMY SALPINGO OOPHORECTOMY Bilateral ~ 2012   • TUBAL ABDOMINAL LIGATION       The following portions of the patient's history were reviewed and updated as appropriate: allergies, current medications, past family history, past medical history, past social history, past surgical history and problem list.    Vitals:  /73   Pulse 88   Ht 165.1 cm (65\")   Wt 104 kg (230 lb)   SpO2 98%   BMI 38.27 kg/m²     Physical Exam  Vitals reviewed.   Constitutional:       General: She is not in acute distress.  Neurological:      Mental Status: She is alert and oriented to person, place, and time.   Psychiatric:         Mood and Affect: Affect normal.       Labs/Imaging    Hemoglobin A1c  Lab Results   Component Value Date    HGBA1C 7.0 05/30/2023    HGBA1C 6.9 02/24/2023    HGBA1C 6.7 07/19/2022     Glucose  Lab Results   Component Value Date    POCGLU 138 (A) 05/30/2023       Current Outpatient Medications   Medication Instructions   • acetaminophen (TYLENOL) 1,000 mg, Oral, Every 8 Hours PRN   • amLODIPine (NORVASC) 5 MG tablet No dose, route, or frequency recorded.   • aspirin 81 mg, Oral, Every Other Day   • atenolol (TENORMIN) 25 mg, Oral, Daily   • Blood Glucose Monitoring Suppl (ACCU-CHEK LUÍS PLUS) W/DEVICE kit No dose, route, or frequency recorded.   • estradiol (ESTRACE) 0.5 MG tablet Take 1 tablet by mouth every other day   • gabapentin (NEURONTIN) 300 mg, Oral, Nightly   • glipizide (GLUCOTROL XL) 2.5 MG 24 hr tablet TAKE 2 TABLETS EVERY DAY   • glucose blood (Accu-Chek Luís Plus) test strip TEST BLOOD SUGAR EVERY DAY   • metFORMIN ER (GLUCOPHAGE-XR) 1,000 mg, Oral, 2 Times Daily   • Mirabegron ER (Myrbetriq) 50 MG tablet sustained-release 24 hour 24 hr tablet TAKE 1 TABLET EVERY EVENING   • Multiple " Vitamins-Minerals (DAILY MULTI VITAMIN/MINERALS PO) Oral   • Ozempic (0.25 or 0.5 MG/DOSE) 0.5 mg, Subcutaneous, Weekly   • PreviDent 5000 Sensitive 1.1-5 % gel No dose, route, or frequency recorded.       Assessment & Plan  Diagnoses and all orders for this visit:    1. Type 2 diabetes mellitus with hyperglycemia, without long-term current use of insulin (Primary)  Assessment & Plan:  A1c at goal, but is creeping back up at 7.0% today.  She would like to try increasing Ozempic to 0.5 mg weekly again.  Continue metformin ER and glipizide XL.  Consider Mounjaro next visit.  Encouraged regular exercise.  Encouraged nutritious dietary choices, moderation of carbohydrates, avoidance of added sugar, weight loss.  Diabetes will be reassessed in 3 months.    Orders:  -     POC Glucose, Blood  -     POC Glycosylated Hemoglobin (Hb A1C)      Return in about 3 months (around 8/30/2023) for next scheduled follow up with foot exam. She was advised to contact the office with any interval questions or concerns.    ODESSA Huitron  Endocrinology  05/30/2023

## 2023-07-31 ENCOUNTER — TRANSCRIBE ORDERS (OUTPATIENT)
Dept: ADMINISTRATIVE | Facility: HOSPITAL | Age: 73
End: 2023-07-31
Payer: MEDICARE

## 2023-07-31 DIAGNOSIS — Z12.31 VISIT FOR SCREENING MAMMOGRAM: Primary | ICD-10-CM

## 2023-08-16 ENCOUNTER — OFFICE VISIT (OUTPATIENT)
Dept: ENDOCRINOLOGY | Facility: CLINIC | Age: 73
End: 2023-08-16
Payer: MEDICARE

## 2023-08-16 VITALS
WEIGHT: 225.8 LBS | HEART RATE: 78 BPM | HEIGHT: 65 IN | BODY MASS INDEX: 37.62 KG/M2 | DIASTOLIC BLOOD PRESSURE: 86 MMHG | SYSTOLIC BLOOD PRESSURE: 144 MMHG | OXYGEN SATURATION: 99 %

## 2023-08-16 DIAGNOSIS — E11.65 TYPE 2 DIABETES MELLITUS WITH HYPERGLYCEMIA, WITHOUT LONG-TERM CURRENT USE OF INSULIN: Primary | ICD-10-CM

## 2023-08-16 LAB
EXPIRATION DATE: NORMAL
GLUCOSE BLDC GLUCOMTR-MCNC: 121 MG/DL (ref 70–130)
HBA1C MFR BLD: 6.7 %
Lab: NORMAL

## 2023-08-16 PROCEDURE — 83036 HEMOGLOBIN GLYCOSYLATED A1C: CPT | Performed by: INTERNAL MEDICINE

## 2023-08-16 PROCEDURE — 82947 ASSAY GLUCOSE BLOOD QUANT: CPT | Performed by: INTERNAL MEDICINE

## 2023-08-16 PROCEDURE — 99214 OFFICE O/P EST MOD 30 MIN: CPT | Performed by: INTERNAL MEDICINE

## 2023-08-16 PROCEDURE — 3044F HG A1C LEVEL LT 7.0%: CPT | Performed by: INTERNAL MEDICINE

## 2023-08-16 PROCEDURE — 3077F SYST BP >= 140 MM HG: CPT | Performed by: INTERNAL MEDICINE

## 2023-08-16 PROCEDURE — 3079F DIAST BP 80-89 MM HG: CPT | Performed by: INTERNAL MEDICINE

## 2023-08-16 RX ORDER — DOXYCYCLINE 100 MG/1
1 CAPSULE ORAL EVERY 12 HOURS SCHEDULED
COMMUNITY
Start: 2023-08-14

## 2023-08-16 NOTE — PROGRESS NOTES
"     Office Note      Date: 2023  Patient Name: Yulissa Bauer  MRN: [id number]  : 1950    Chief Complaint   Patient presents with    Diabetes       History of Present Illness  Yulissa Bauer is a 72 y.o. female who presents today for follow up on type 2 diabetes.   Diabetes was diagnosed in .  Known diabetic complications: peripheral neuropathy.  Current diabetic medications: Ozempic, metformin ER, glipizide XL.  Past diabetic medications: Jardiance (UTIs), Januvia.  She is testing blood sugars once daily.  Fasting readings typically 140-160 range.  Lowest has been 117.  She denies any hypoglycemia.  She does feel like appetite is decreased on low-dose of Ozempic.  She is not exercising regularly.  ACE inhibitor/ARB: No.  Statin: No.    She has constipation with the Ozempic 0.25 mg.      Review of Systems   Gastrointestinal:  Positive for constipation.       Vitals:  /73   Pulse 88   Ht 165.1 cm (65\")   Wt 104 kg (230 lb)   SpO2 98%   BMI 38.27 kg/mý     Physical Exam  Constitutional:       General: She is not in acute distress.  Neurological:      Mental Status: She is alert and oriented to person, place, and time.   Psychiatric:         Mood and Affect: Affect normal.     Labs/Imaging    Hemoglobin A1c  Lab Results   Component Value Date    HGBA1C 6.7 2023    HGBA1C 7.0 2023    HGBA1C 6.9 2023     Glucose  Lab Results   Component Value Date    POCGLU 121 2023       Current Outpatient Medications   Medication Instructions    acetaminophen (TYLENOL) 1,000 mg, Oral, Every 8 Hours PRN    amLODIPine (NORVASC) 5 MG tablet No dose, route, or frequency recorded.    aspirin 81 mg, Oral, Every Other Day    atenolol (TENORMIN) 25 mg, Oral, Daily    Blood Glucose Monitoring Suppl (ACCU-CHEK LUÍS PLUS) W/DEVICE kit No dose, route, or frequency recorded.    doxycycline (MONODOX) 100 MG capsule 1 capsule, Oral, Every 12 Hours Scheduled    estradiol (ESTRACE) 0.5 " MG tablet Take 1 tablet by mouth every other day    gabapentin (NEURONTIN) 300 mg, Oral, Nightly    glipizide (GLUCOTROL XL) 5 mg, Oral, Daily    glucose blood (Accu-Chek Rolanda Plus) test strip TEST BLOOD SUGAR EVERY DAY    metFORMIN ER (GLUCOPHAGE-XR) 1,000 mg, Oral, 2 Times Daily    Mirabegron ER (Myrbetriq) 50 MG tablet sustained-release 24 hour 24 hr tablet TAKE 1 TABLET EVERY EVENING    Multiple Vitamins-Minerals (DAILY MULTI VITAMIN/MINERALS PO) Oral    Ozempic (0.25 or 0.5 MG/DOSE) 0.5 mg, Subcutaneous, Weekly    PreviDent 5000 Sensitive 1.1-5 % gel No dose, route, or frequency recorded.       Assessment & Plan  Diagnoses and all orders for this visit:    1. Type 2 diabetes mellitus with hyperglycemia, without long-term current use of insulin (Primary)  -     POC Glycosylated Hemoglobin (Hb A1C)  -     POC Glucose, Blood    Cont 0.25 mg weekly Ozempic. Cont metformin and glipizide.   Scheduled for eye exam next week.     She is taking Vit D supplements daily.     Return in about 3 months (around 11/16/2023). She was advised to contact the office with any interval questions or concerns.    Salena Diane MD  Endocrinology  08/16/2023

## 2023-09-05 ENCOUNTER — HOSPITAL ENCOUNTER (OUTPATIENT)
Dept: MAMMOGRAPHY | Facility: HOSPITAL | Age: 73
Discharge: HOME | End: 2023-09-05
Admitting: INTERNAL MEDICINE
Payer: MEDICARE

## 2023-09-05 ENCOUNTER — OFFICE VISIT (OUTPATIENT)
Dept: OBSTETRICS AND GYNECOLOGY | Facility: CLINIC | Age: 73
End: 2023-09-05
Payer: MEDICARE

## 2023-09-05 VITALS
BODY MASS INDEX: 37.49 KG/M2 | SYSTOLIC BLOOD PRESSURE: 150 MMHG | WEIGHT: 225 LBS | HEIGHT: 65 IN | DIASTOLIC BLOOD PRESSURE: 84 MMHG

## 2023-09-05 DIAGNOSIS — Z79.890 HORMONE REPLACEMENT THERAPY: ICD-10-CM

## 2023-09-05 DIAGNOSIS — Z12.31 VISIT FOR SCREENING MAMMOGRAM: ICD-10-CM

## 2023-09-05 DIAGNOSIS — Z01.419 ENCOUNTER FOR GYNECOLOGICAL EXAMINATION WITHOUT ABNORMAL FINDING: Primary | ICD-10-CM

## 2023-09-05 PROCEDURE — 77067 SCR MAMMO BI INCL CAD: CPT

## 2023-09-05 PROCEDURE — 77063 BREAST TOMOSYNTHESIS BI: CPT

## 2023-09-05 RX ORDER — ESTRADIOL 0.5 MG/1
TABLET ORAL
Qty: 45 TABLET | Refills: 3 | Status: SHIPPED | OUTPATIENT
Start: 2023-09-05

## 2023-09-05 RX ORDER — GABAPENTIN 100 MG/1
100 CAPSULE ORAL AS NEEDED
COMMUNITY
Start: 2023-08-22

## 2023-09-05 NOTE — PROGRESS NOTES
Chief Complaint  Yulissa Bauer is a 72 y.o.  female presenting for Gynecologic Exam and Fecal Incontinence (Intermittent.)    History of Present Illness  Yulissa is a very pleasant 71yo woman, , here for annual gyn exam.  She is distant s/p robotic asst TLH/BSO/VVS.  She also had a renal cancer & a partial nephrectomy.    She had a large enterocele repair in 2022 by Dr. Tay.  (It was life changing for her, in a good way.)  She can sometimes feel part of the mesh (this is not a new finding), but never any vaginitis sx or bldg.  No longer SA.      Mammogram was updated today.  Colonoscopy up to date until 2024.        The following portions of the patient's history were reviewed and updated as appropriate: allergies, current medications, past family history, past medical history, past social history, past surgical history, and problem list.    Allergies   Allergen Reactions    Dilaudid [Hydromorphone] Itching    Aygestin [Norethindrone]     Cephalosporins     Codeine     Penicillins     Prometrium [Progesterone]     Cephalexin Rash    Ciprofloxacin Rash    Hydrocodone Rash    Nitrofurantoin Macrocrystal Rash         Current Outpatient Medications:     estradiol (ESTRACE) 0.5 MG tablet, Take 1 tablet by mouth every other day, Disp: 45 tablet, Rfl: 3    gabapentin (NEURONTIN) 100 MG capsule, Take 1 capsule by mouth As Needed., Disp: , Rfl:     acetaminophen (TYLENOL) 650 MG 8 hr tablet, Take 1,000 mg by mouth Every 8 (Eight) Hours As Needed for Mild Pain ., Disp: , Rfl:     amLODIPine (NORVASC) 5 MG tablet, , Disp: , Rfl: 0    aspirin 81 MG chewable tablet, Chew 1 tablet Every Other Day., Disp: , Rfl:     atenolol (TENORMIN) 25 MG tablet, Take 1 tablet by mouth Daily., Disp: , Rfl:     Blood Glucose Monitoring Suppl (ACCU-CHEK LUÍS PLUS) W/DEVICE kit, , Disp: , Rfl:     gabapentin (NEURONTIN) 300 MG capsule, Take 1 capsule by mouth Every Night., Disp: , Rfl:     glipizide (GLUCOTROL XL) 2.5  MG 24 hr tablet, Take 2 tablets by mouth Daily., Disp: 180 tablet, Rfl: 1    glucose blood (Accu-Chek Rolanda Plus) test strip, TEST BLOOD SUGAR EVERY DAY, Disp: 100 each, Rfl: 3    metFORMIN ER (GLUCOPHAGE-XR) 500 MG 24 hr tablet, Take 2 tablets by mouth 2 (Two) Times a Day., Disp: 360 tablet, Rfl: 3    Mirabegron ER (Myrbetriq) 50 MG tablet sustained-release 24 hour 24 hr tablet, TAKE 1 TABLET EVERY EVENING, Disp: 90 tablet, Rfl: 3    Multiple Vitamins-Minerals (DAILY MULTI VITAMIN/MINERALS PO), Take  by mouth., Disp: , Rfl:     PreviDent 5000 Sensitive 1.1-5 % gel, , Disp: , Rfl:     Semaglutide,0.25 or 0.5MG/DOS, (Ozempic, 0.25 or 0.5 MG/DOSE,) 2 MG/1.5ML solution pen-injector, Inject 0.5 mg under the skin into the appropriate area as directed 1 (One) Time Per Week., Disp: 4.5 mL, Rfl: 1    Past Medical History:   Diagnosis Date    Benign essential hypertension     Carcinoma in situ of skin of chest wall     History of nephrolithiasis     Hormone replacement therapy (HRT)     Hypertension     Hypertriglyceridemia     Injury of left foot     Tendon injury left foot-Abstracted from [employer]    Inverse psoriasis     Menopause     Obesity     Personal history of kidney cancer     Primary hyperparathyroidism     Rectocele     Sciatica     Type 2 diabetes mellitus     Urine incontinence     Uterine prolapse     Vitamin D deficiency years ago        Past Surgical History:   Procedure Laterality Date    BREAST CYST ASPIRATION Left     COLONOSCOPY      COLPORRHAPHY      posterior    DILATATION AND CURETTAGE      HYSTEROSCOPY      D&C X2    LAPAROSCOPIC PARTIAL NEPHRECTOMY      Renal cell carcinoma    LIPOSUCTION      LUMBAR FUSION      OOPHORECTOMY Bilateral ~ 2012    OTHER SURGICAL HISTORY      Lithotripsy    PARATHYROIDECTOMY  06/2006    PELVIC LAPAROSCOPY      SPINAL FUSION      L3 and L4 due to sciatica    TOTAL LAPAROSCOPIC HYSTERECTOMY SALPINGO OOPHORECTOMY Bilateral ~ 2012    TUBAL ABDOMINAL LIGATION    "      Objective  /84   Ht 165.1 cm (65\")   Wt 102 kg (225 lb)   LMP  (LMP Unknown) Comment: LMP PM ~ PRIOR TO AGE 60  Breastfeeding No   BMI 37.44 kg/m²     Physical Exam  Vitals and nursing note reviewed. Exam conducted with a chaperone present.   Constitutional:       General: She is not in acute distress.     Appearance: Normal appearance. She is not ill-appearing.   HENT:      Head: Normocephalic.   Neck:      Thyroid: No thyroid mass or thyromegaly.   Cardiovascular:      Rate and Rhythm: Normal rate and regular rhythm.      Heart sounds: Normal heart sounds. No murmur heard.  Pulmonary:      Effort: Pulmonary effort is normal. No respiratory distress.      Breath sounds: Normal breath sounds.   Chest:   Breasts:     Right: No inverted nipple, mass or nipple discharge.      Left: No inverted nipple, mass or nipple discharge.   Abdominal:      Palpations: Abdomen is soft. There is no mass.      Tenderness: There is no abdominal tenderness.   Genitourinary:     General: Normal vulva.      Labia:         Right: No rash, tenderness or lesion.         Left: No rash, tenderness or lesion.       Vagina: No vaginal discharge, erythema, tenderness or lesions.      Uterus: Absent.       Adnexa:         Right: No mass or tenderness.          Left: No mass or tenderness.        Comments: Mucosa is still fairly well estrogenized.    I could not visualize any mesh.    Anus appears wnl.  No rectal exam performed.  Lymphadenopathy:      Upper Body:      Right upper body: No supraclavicular or axillary adenopathy.      Left upper body: No supraclavicular or axillary adenopathy.   Skin:     General: Skin is warm and dry.   Neurological:      Mental Status: She is alert and oriented to person, place, and time.   Psychiatric:         Mood and Affect: Mood normal.         Behavior: Behavior normal.       Assessment/Plan   Diagnoses and all orders for this visit:    1. Encounter for gynecological examination without " abnormal finding (Primary)    2. Hormone replacement therapy  -     estradiol (ESTRACE) 0.5 MG tablet; Take 1 tablet by mouth every other day  Dispense: 45 tablet; Refill: 3    She may decrease the way she uses this Rx even further, and may wean off systemic Tx this year.  Reassured about the safety of local vaginal estradiol cream or suppositories.  (Without the risks of systemic absorption)    Procedures    40 to 64: Counseling/Anticipatory Guidance Discussed: self-breast exam and ERT Risks & benefits.    Return in about 1 year (around 9/5/2024) for Annual physical.    Lesa Ding, APRN  09/05/2023

## 2023-11-01 DIAGNOSIS — N32.81 OAB (OVERACTIVE BLADDER): ICD-10-CM

## 2023-11-07 ENCOUNTER — TELEPHONE (OUTPATIENT)
Dept: OBSTETRICS AND GYNECOLOGY | Facility: CLINIC | Age: 73
End: 2023-11-07
Payer: MEDICARE

## 2023-11-07 DIAGNOSIS — N32.81 OAB (OVERACTIVE BLADDER): ICD-10-CM

## 2023-11-07 NOTE — TELEPHONE ENCOUNTER
PT CALLING STATES HER MILDRED SHOULD BEEN CALLED INTO [facility] - CAN WE RESEND TO THEM ?

## 2023-12-24 DIAGNOSIS — E11.65 TYPE 2 DIABETES MELLITUS WITH HYPERGLYCEMIA, WITHOUT LONG-TERM CURRENT USE OF INSULIN: Chronic | ICD-10-CM

## 2023-12-26 RX ORDER — GLIPIZIDE 2.5 MG/1
5 TABLET, EXTENDED RELEASE ORAL DAILY
Qty: 180 TABLET | Refills: 3 | Status: SHIPPED | OUTPATIENT
Start: 2023-12-26

## 2023-12-26 NOTE — TELEPHONE ENCOUNTER
Rx Refill Note  Requested Prescriptions     Pending Prescriptions Disp Refills    glipizide (GLUCOTROL XL) 2.5 MG 24 hr tablet [Pharmacy Med Name: GLIPIZIDE ER 2.5 MG Tablet Extended Release 24 Hour] 180 tablet 3     Sig: TAKE 2 TABLETS EVERY DAY      Last office visit with prescribing clinician:8/16/23    Next office visit with prescribing clinician:2/2/24        Rafi Russell MA  12/26/23, 08:21 EST

## 2024-01-05 RX ORDER — BLOOD SUGAR DIAGNOSTIC
STRIP MISCELLANEOUS
Qty: 200 EACH | Refills: 3 | Status: SHIPPED | OUTPATIENT
Start: 2024-01-05

## 2024-01-05 NOTE — TELEPHONE ENCOUNTER
Rx Refill Note  Requested Prescriptions     Signed Prescriptions Disp Refills    glucose blood (Accu-Chek Rolanda Plus) test strip 200 each 3     Sig: TEST BLOOD SUGAR EVERY DAY     Authorizing Provider: ELIZABETH ASKEW     Ordering User: KIESHA MORGAN      Last office visit with prescribing clinician: 8/16/2023   Last telemedicine visit with prescribing clinician: Visit date not found   Next office visit with prescribing clinician: 2/2/2024                         Would you like a call back once the refill request has been completed: [] Yes [] No    If the office needs to give you a call back, can they leave a voicemail: [] Yes [] No    Kiesha Morgan MA  01/05/24, 09:01 EST

## 2024-02-02 ENCOUNTER — OFFICE VISIT (OUTPATIENT)
Dept: ENDOCRINOLOGY | Facility: CLINIC | Age: 74
End: 2024-02-02
Payer: MEDICARE

## 2024-02-02 VITALS
WEIGHT: 217 LBS | BODY MASS INDEX: 36.15 KG/M2 | HEART RATE: 73 BPM | SYSTOLIC BLOOD PRESSURE: 138 MMHG | HEIGHT: 65 IN | DIASTOLIC BLOOD PRESSURE: 72 MMHG

## 2024-02-02 DIAGNOSIS — E11.65 TYPE 2 DIABETES MELLITUS WITH HYPERGLYCEMIA, WITHOUT LONG-TERM CURRENT USE OF INSULIN: Chronic | ICD-10-CM

## 2024-02-02 PROCEDURE — 3075F SYST BP GE 130 - 139MM HG: CPT | Performed by: INTERNAL MEDICINE

## 2024-02-02 PROCEDURE — 3078F DIAST BP <80 MM HG: CPT | Performed by: INTERNAL MEDICINE

## 2024-02-02 PROCEDURE — 99213 OFFICE O/P EST LOW 20 MIN: CPT | Performed by: INTERNAL MEDICINE

## 2024-02-02 RX ORDER — BLOOD SUGAR DIAGNOSTIC
STRIP MISCELLANEOUS
Qty: 200 EACH | Refills: 3 | Status: SHIPPED | OUTPATIENT
Start: 2024-02-02

## 2024-02-02 RX ORDER — METFORMIN HYDROCHLORIDE 500 MG/1
1000 TABLET, EXTENDED RELEASE ORAL 2 TIMES DAILY
Qty: 360 TABLET | Refills: 3 | Status: SHIPPED | OUTPATIENT
Start: 2024-02-02

## 2024-02-02 RX ORDER — GLIPIZIDE 2.5 MG/1
5 TABLET, EXTENDED RELEASE ORAL DAILY
Qty: 180 TABLET | Refills: 3 | Status: SHIPPED | OUTPATIENT
Start: 2024-02-02

## 2024-02-02 NOTE — PROGRESS NOTES
"     Office Note      Date: 2024  Patient Name: Yulissa Bauer  MRN: [id number]  : 1950    Chief Complaint   Patient presents with    Diabetes       History of Present Illness  Yulissa Bauer is a 73 y.o. female who presents today for follow up on type 2 diabetes.   Diabetes was diagnosed in .  Known diabetic complications: peripheral neuropathy.  Current diabetic medications: Ozempic, metformin ER, glipizide XL.  Past diabetic medications: Jardiance (UTIs), Januvia.  She is testing blood sugars once daily.  Fasting readings typically 140-160 range.    She is not exercising regularly.  ACE inhibitor/ARB: No.  Statin: No.    She has constipation with the Ozempic higher dose, but doing ok on 0.25 mg.  Dec 2023 A1C was 6.9      Review of Systems   Gastrointestinal:  Positive for constipation.         Vitals:  Vitals:    24 1354   BP: 138/72   Pulse: 73   Weight: 98.4 kg (217 lb)   Height: 165.1 cm (65\")        Physical Exam  Constitutional:       General: She is not in acute distress.  Cardiovascular:      Rate and Rhythm: Normal rate and regular rhythm.      Pulses: Normal pulses.      Heart sounds: Normal heart sounds.   Musculoskeletal:         General: No swelling.   Neurological:      Mental Status: She is alert and oriented to person, place, and time.   Psychiatric:         Mood and Affect: Affect normal.       Labs/Imaging    Hemoglobin A1c  Lab Results   Component Value Date    HGBA1C 6.7 2023    HGBA1C 7.0 2023    HGBA1C 6.9 2023     Glucose  Lab Results   Component Value Date    POCGLU 121 2023       Current Outpatient Medications   Medication Instructions    acetaminophen (TYLENOL) 1,000 mg, Oral, Every 8 Hours PRN    amLODIPine (NORVASC) 5 MG tablet No dose, route, or frequency recorded.    aspirin 81 mg, Oral, Every Other Day    atenolol (TENORMIN) 50 mg, Oral, Daily    Blood Glucose Monitoring Suppl (ACCU-CHEK LUÍS PLUS) W/DEVICE kit No dose, " route, or frequency recorded.    estradiol (ESTRACE) 0.5 MG tablet Take 1 tablet by mouth every other day    gabapentin (NEURONTIN) 300 mg, Oral, Nightly    gabapentin (NEURONTIN) 100 mg, Oral, As Needed    glipizide (GLUCOTROL XL) 5 mg, Oral, Daily    glucose blood (Accu-Chek Rolanda Plus) test strip TEST BLOOD SUGAR twice a DAY    metFORMIN ER (GLUCOPHAGE-XR) 1,000 mg, Oral, 2 Times Daily    Mirabegron ER (Myrbetriq) 50 MG tablet sustained-release 24 hour 24 hr tablet TAKE 1 TABLET EVERY EVENING    Multiple Vitamins-Minerals (DAILY MULTI VITAMIN/MINERALS PO) Oral    Ozempic (0.25 or 0.5 MG/DOSE) 0.5 mg, Subcutaneous, Weekly    PreviDent 5000 Sensitive 1.1-5 % gel No dose, route, or frequency recorded.       Assessment & Plan  Diagnoses and all orders for this visit:    1. Type 2 diabetes mellitus with hyperglycemia, without long-term current use of insulin  -     metFORMIN ER (GLUCOPHAGE-XR) 500 MG 24 hr tablet; Take 2 tablets by mouth 2 (Two) Times a Day.  Dispense: 360 tablet; Refill: 3  -     glipizide (GLUCOTROL XL) 2.5 MG 24 hr tablet; Take 2 tablets by mouth Daily.  Dispense: 180 tablet; Refill: 3    Other orders  -     glucose blood (Accu-Chek Rolanda Plus) test strip; TEST BLOOD SUGAR twice a DAY  Dispense: 200 each; Refill: 3      Cont 0.25 mg weekly Ozempic. Cont metformin and glipizide.   Reviewed management of hyperglycemia after steroids.     Return in about 3 months (around 5/2/2024) for 15 min appointment. She was advised to contact the office with any interval questions or concerns.    Salena Diane MD  Endocrinology  02/02/2024

## 2024-04-04 ENCOUNTER — TELEPHONE (OUTPATIENT)
Dept: GASTROENTEROLOGY | Facility: CLINIC | Age: 74
End: 2024-04-04
Payer: MEDICARE

## 2024-04-04 NOTE — TELEPHONE ENCOUNTER
Hub staff attempted to follow warm transfer process and was unsuccessful     Caller: Yulissa Bauer    Relationship to patient: Self    Best call back number: [phone]     Patient is needing: PATIENT IS NEEDING TO SCHEDULE THEIR COLONOSCOPY FROM A RECALL WITH DR BYRNES. PLEASE CALL PATIENT.

## 2024-05-03 ENCOUNTER — OFFICE VISIT (OUTPATIENT)
Dept: ENDOCRINOLOGY | Facility: CLINIC | Age: 74
End: 2024-05-03
Payer: MEDICARE

## 2024-05-03 VITALS
BODY MASS INDEX: 36.32 KG/M2 | HEART RATE: 78 BPM | DIASTOLIC BLOOD PRESSURE: 72 MMHG | SYSTOLIC BLOOD PRESSURE: 136 MMHG | HEIGHT: 65 IN | WEIGHT: 218 LBS

## 2024-05-03 DIAGNOSIS — E11.65 TYPE 2 DIABETES MELLITUS WITH HYPERGLYCEMIA, WITHOUT LONG-TERM CURRENT USE OF INSULIN: Primary | Chronic | ICD-10-CM

## 2024-05-03 PROCEDURE — 99213 OFFICE O/P EST LOW 20 MIN: CPT | Performed by: INTERNAL MEDICINE

## 2024-05-03 PROCEDURE — 3078F DIAST BP <80 MM HG: CPT | Performed by: INTERNAL MEDICINE

## 2024-05-03 PROCEDURE — 3075F SYST BP GE 130 - 139MM HG: CPT | Performed by: INTERNAL MEDICINE

## 2024-05-03 NOTE — PROGRESS NOTES
"     Office Note      Date: 2024  Patient Name: Yulissa Bauer  MRN: [id number]  : 1950    Chief Complaint   Patient presents with    Diabetes       History of Present Illness  Yulissa Bauer is a 73 y.o. female who presents today for follow up on type 2 diabetes.   Diabetes was diagnosed in .  Known diabetic complications: peripheral neuropathy.  Current diabetic medications: Ozempic, metformin ER, glipizide XL.  Past diabetic medications: Jardiance (UTIs), Januvia.  She is testing blood sugars once daily.  Fasting readings typically 140-160 range.    She is not exercising regularly.  ACE inhibitor/ARB: No.  Statin: No.    She has constipation with the Ozempic higher dose, but doing ok on 0.25 mg.   Glucose is 120-140. She had cataract surgery and still has some watery eye on the left side and pain.       Review of Systems   Gastrointestinal:  Positive for constipation.         Vitals:  Vitals:    24 1455   BP: 136/72   Pulse: 78   Weight: 98.9 kg (218 lb)   Height: 165.1 cm (65\")        Physical Exam  Constitutional:       General: She is not in acute distress.  Cardiovascular:      Rate and Rhythm: Normal rate and regular rhythm.      Pulses: Normal pulses.      Heart sounds: Normal heart sounds.   Musculoskeletal:         General: No swelling.   Neurological:      Mental Status: She is alert and oriented to person, place, and time.   Psychiatric:         Mood and Affect: Affect normal.       Labs/Imaging    Hemoglobin A1c  Lab Results   Component Value Date    HGBA1C 6.7 2023    HGBA1C 7.0 2023    HGBA1C 6.9 2023     Glucose  Lab Results   Component Value Date    POCGLU 121 2023       Current Outpatient Medications   Medication Instructions    acetaminophen (TYLENOL) 1,000 mg, Oral, Every 8 Hours PRN    amLODIPine (NORVASC) 5 MG tablet No dose, route, or frequency recorded.    aspirin 81 mg, Oral, Every Other Day    atenolol (TENORMIN) 50 mg, Oral, Daily "    Blood Glucose Monitoring Suppl (ACCU-CHEK LUÍS PLUS) W/DEVICE kit No dose, route, or frequency recorded.    estradiol (ESTRACE) 0.5 MG tablet Take 1 tablet by mouth every other day    gabapentin (NEURONTIN) 300 mg, Oral, Nightly    gabapentin (NEURONTIN) 100 mg, Oral, As Needed    glipizide (GLUCOTROL XL) 5 mg, Oral, Daily    glucose blood (Accu-Chek Luís Plus) test strip TEST BLOOD SUGAR twice a DAY    metFORMIN ER (GLUCOPHAGE-XR) 1,000 mg, Oral, 2 Times Daily    Mirabegron ER (Myrbetriq) 50 MG tablet sustained-release 24 hour 24 hr tablet TAKE 1 TABLET EVERY EVENING    Multiple Vitamins-Minerals (DAILY MULTI VITAMIN/MINERALS PO) Oral    Ozempic (0.25 or 0.5 MG/DOSE) 0.5 mg, Subcutaneous, Weekly    PreviDent 5000 Sensitive 1.1-5 % gel No dose, route, or frequency recorded.       Assessment & Plan  Diagnoses and all orders for this visit:    1. Type 2 diabetes mellitus with hyperglycemia, without long-term current use of insulin (Primary)      Cont 0.25 mg weekly Ozempic. Cont metformin and glipizide.   Reviewed management of hyperglycemia after steroids. Incresaed dose of glipizide is effective.     Return in about 3 months (around 8/3/2024) for 15 min appointment. She was advised to contact the office with any interval questions or concerns.    Salena Diane MD  Endocrinology  05/03/2024

## 2024-06-26 RX ORDER — SEMAGLUTIDE 0.68 MG/ML
INJECTION, SOLUTION SUBCUTANEOUS
Qty: 4.5 ML | Refills: 0 | Status: SHIPPED | OUTPATIENT
Start: 2024-06-26

## 2024-06-26 RX ORDER — BLOOD SUGAR DIAGNOSTIC
STRIP MISCELLANEOUS
Qty: 100 EACH | Refills: 3 | Status: SHIPPED | OUTPATIENT
Start: 2024-06-26

## 2024-06-26 NOTE — TELEPHONE ENCOUNTER
Rx Refill Note  Requested Prescriptions     Pending Prescriptions Disp Refills    glucose blood (Accu-Chek Rolanda Plus) test strip [Pharmacy Med Name: ACCU-CHEK ROLANDA PLUS   Strip] 100 each 3     Sig: TEST BLOOD SUGAR EVERY DAY    Ozempic, 0.25 or 0.5 MG/DOSE, 2 MG/3ML solution pen-injector [Pharmacy Med Name: OZEMPIC 2 MG/3ML Solution Pen-injector] 4.5 mL 1     Sig: INJECT 0.5MG UNDER THE SKIN INTO THE APPROPRIATE AREA ONE TIME WEEKLY AS DIRECTED      Last office visit with prescribing clinician: Visit date not found     Next office visit with prescribing clinician: 08/06/2024                          Rosmery Thapa MA  06/26/24, 08:33 EDT

## 2024-07-23 RX ORDER — SEMAGLUTIDE 0.68 MG/ML
INJECTION, SOLUTION SUBCUTANEOUS
Qty: 9 ML | Refills: 3 | Status: SHIPPED | OUTPATIENT
Start: 2024-07-23

## 2024-08-23 ENCOUNTER — TRANSCRIBE ORDERS (OUTPATIENT)
Dept: ADMINISTRATIVE | Facility: HOSPITAL | Age: 74
End: 2024-08-23
Payer: MEDICARE

## 2024-08-23 DIAGNOSIS — Z12.31 VISIT FOR SCREENING MAMMOGRAM: Primary | ICD-10-CM

## 2024-08-25 LAB
NCCN CRITERIA FLAG: NORMAL
TYRER CUZICK SCORE: 8.9

## 2024-09-04 ENCOUNTER — OFFICE VISIT (OUTPATIENT)
Dept: ENDOCRINOLOGY | Facility: CLINIC | Age: 74
End: 2024-09-04
Payer: MEDICARE

## 2024-09-04 VITALS
HEART RATE: 76 BPM | WEIGHT: 220.4 LBS | HEIGHT: 65 IN | BODY MASS INDEX: 36.72 KG/M2 | OXYGEN SATURATION: 97 % | DIASTOLIC BLOOD PRESSURE: 80 MMHG | SYSTOLIC BLOOD PRESSURE: 132 MMHG

## 2024-09-04 DIAGNOSIS — I10 PRIMARY HYPERTENSION: ICD-10-CM

## 2024-09-04 DIAGNOSIS — E11.65 TYPE 2 DIABETES MELLITUS WITH HYPERGLYCEMIA, WITHOUT LONG-TERM CURRENT USE OF INSULIN: Primary | ICD-10-CM

## 2024-09-04 LAB
EXPIRATION DATE: ABNORMAL
EXPIRATION DATE: ABNORMAL
GLUCOSE BLDC GLUCOMTR-MCNC: 141 MG/DL (ref 70–130)
HBA1C MFR BLD: 7 % (ref 4.5–5.7)
Lab: ABNORMAL
Lab: ABNORMAL

## 2024-09-04 RX ORDER — ATENOLOL 50 MG/1
1 TABLET ORAL DAILY
COMMUNITY

## 2024-09-04 NOTE — PROGRESS NOTES
"     Office Note      Date: 2024  Patient Name: Yulissa Bauer  MRN: [id number]  : 1950    Chief Complaint   Patient presents with    Diabetes       History of Present Illness  Yulissa Bauer is a 73 y.o. female who presents today for follow up on type 2 diabetes.   Diabetes was diagnosed in .  Known diabetic complications: peripheral neuropathy.  Current diabetic medications: Ozempic low dose, metformin ER, glipizide XL.  Past diabetic medications: Jardiance (UTIs), Januvia.  She is testing blood sugars once daily.  Fasting readings typically 120-140 range.    She is not exercising regularly.  ACE inhibitor/ARB: yes  Statin: No.    She has constipation with the Ozempic higher dose, but doing ok on 0.25 mg.  She has more dietary variations when spending time with grandkids and her glucose is higher.   Had recent eye exam and cataract surgery         Review of Systems   Gastrointestinal:  Positive for constipation.         Vitals:  Vitals:    24 1040   BP: 132/80   Pulse: 76   SpO2: 97%   Weight: 100 kg (220 lb 6.4 oz)   Height: 165.1 cm (65\")        Physical Exam  Constitutional:       General: She is not in acute distress.  Cardiovascular:      Rate and Rhythm: Normal rate and regular rhythm.      Pulses: Normal pulses.      Heart sounds: Normal heart sounds.   Musculoskeletal:         General: No swelling.   Neurological:      Mental Status: She is alert and oriented to person, place, and time.   Psychiatric:         Mood and Affect: Affect normal.       Labs/Imaging    Hemoglobin A1c  Lab Results   Component Value Date    HGBA1C 7.0 (A) 2024    HGBA1C 6.7 2023    HGBA1C 7.0 2023     Glucose  Lab Results   Component Value Date    POCGLU 141 (A) 2024       Current Outpatient Medications   Medication Instructions    acetaminophen (TYLENOL) 1,000 mg, Oral, Every 8 Hours PRN    amLODIPine (NORVASC) 5 MG tablet No dose, route, or frequency recorded.    aspirin " 81 mg, Oral, Every Other Day    atenolol (TENORMIN) 50 MG tablet 1 tablet, Oral, Daily    Blood Glucose Monitoring Suppl (ACCU-CHEK LUÍS PLUS) W/DEVICE kit No dose, route, or frequency recorded.    estradiol (ESTRACE) 0.5 MG tablet Take 1 tablet by mouth every other day    gabapentin (NEURONTIN) 300 mg, Oral, Nightly    gabapentin (NEURONTIN) 100 mg, Oral, As Needed    glipizide (GLUCOTROL XL) 5 mg, Oral, Daily    glucose blood (Accu-Chek Luís Plus) test strip TEST BLOOD SUGAR EVERY DAY    metFORMIN ER (GLUCOPHAGE-XR) 1,000 mg, Oral, 2 Times Daily    Mirabegron ER (Myrbetriq) 50 MG tablet sustained-release 24 hour 24 hr tablet TAKE 1 TABLET EVERY EVENING    Multiple Vitamins-Minerals (DAILY MULTI VITAMIN/MINERALS PO) Oral    PreviDent 5000 Sensitive 1.1-5 % gel No dose, route, or frequency recorded.    Semaglutide,0.25 or 0.5MG/DOS, (Ozempic, 0.25 or 0.5 MG/DOSE,) 2 MG/3ML solution pen-injector INJECT 0.5MG UNDER THE SKIN INTO THE APPROPRIATE AREA ONE TIME WEEKLY AS DIRECTED       Assessment & Plan  Diagnoses and all orders for this visit:    1. Type 2 diabetes mellitus with hyperglycemia, without long-term current use of insulin (Primary)  -     POC Glucose, Blood  -     POC Glycosylated Hemoglobin (Hb A1C)    2. Primary hypertension      Cont 0.25 mg weekly Ozempic. Cont metformin and glipizide.   Reviewed management of hyperglycemia after steroids.   She has used 4 tabs of glipizide after steroids   Scheduled for physical on October. Due for microalbumin.    Return in about 4 months (around 1/4/2025) for 15 min appointment. She was advised to contact the office with any interval questions or concerns.    Salena Diane MD  Endocrinology  09/04/2024

## 2024-09-09 ENCOUNTER — OFFICE VISIT (OUTPATIENT)
Dept: OBSTETRICS AND GYNECOLOGY | Facility: CLINIC | Age: 74
End: 2024-09-09
Payer: MEDICARE

## 2024-09-09 ENCOUNTER — HOSPITAL ENCOUNTER (OUTPATIENT)
Dept: MAMMOGRAPHY | Facility: HOSPITAL | Age: 74
Discharge: HOME | End: 2024-09-09
Admitting: INTERNAL MEDICINE
Payer: MEDICARE

## 2024-09-09 VITALS
DIASTOLIC BLOOD PRESSURE: 80 MMHG | SYSTOLIC BLOOD PRESSURE: 120 MMHG | BODY MASS INDEX: 36.84 KG/M2 | HEIGHT: 65 IN | WEIGHT: 221.1 LBS

## 2024-09-09 DIAGNOSIS — N32.81 OAB (OVERACTIVE BLADDER): ICD-10-CM

## 2024-09-09 DIAGNOSIS — N81.9 FEMALE GENITAL PROLAPSE, UNSPECIFIED TYPE: ICD-10-CM

## 2024-09-09 DIAGNOSIS — Z01.411 ENCOUNTER FOR GYNECOLOGICAL EXAMINATION WITH ABNORMAL FINDING: Primary | ICD-10-CM

## 2024-09-09 DIAGNOSIS — Z78.0 POSTMENOPAUSAL: ICD-10-CM

## 2024-09-09 DIAGNOSIS — Z12.31 VISIT FOR SCREENING MAMMOGRAM: ICD-10-CM

## 2024-09-09 PROCEDURE — 1160F RVW MEDS BY RX/DR IN RCRD: CPT | Performed by: NURSE PRACTITIONER

## 2024-09-09 PROCEDURE — 77063 BREAST TOMOSYNTHESIS BI: CPT

## 2024-09-09 PROCEDURE — 3074F SYST BP LT 130 MM HG: CPT | Performed by: NURSE PRACTITIONER

## 2024-09-09 PROCEDURE — 1159F MED LIST DOCD IN RCRD: CPT | Performed by: NURSE PRACTITIONER

## 2024-09-09 PROCEDURE — G0101 CA SCREEN;PELVIC/BREAST EXAM: HCPCS | Performed by: NURSE PRACTITIONER

## 2024-09-09 PROCEDURE — 77067 SCR MAMMO BI INCL CAD: CPT

## 2024-09-09 PROCEDURE — 3079F DIAST BP 80-89 MM HG: CPT | Performed by: NURSE PRACTITIONER

## 2024-09-09 RX ORDER — MIRABEGRON 50 MG/1
TABLET, EXTENDED RELEASE ORAL
Qty: 90 TABLET | Refills: 3 | Status: SHIPPED | OUTPATIENT
Start: 2024-09-09

## 2024-09-09 NOTE — PROGRESS NOTES
Chief Complaint  Yulissa Bauer is a 73 y.o.  female presenting for Gynecologic Exam and Med Refill (Myrbetriq 50 mg (#90 with refills).  [facility] pharmacy. )    History of Present Illness  Yulissa is a 72yo woman, , here for gyn exam.  Her past surgical history includes, in part, bilateral tubal sterilization, pelvic laparoscopy, hysteroscopy with D&C, and TLH/BSO.    She also has PMHx of left breast cyst aspiration.  Medical history is significant for DM2, HTN, hyperlipidemia, primary hyperparathyroidism, and renal cancer, and skin cancer.  She does have vaginal wall prolapses, and Myrbetriq helps a lot with urinary incontinence issue / and OAB.    Last mammogram --- today  Colonoscopy 2021 (has appt for another in Nov)  DEXA scan 2017 --- she is willing to repeat the scan now.    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      The following portions of the patient's history were reviewed and updated as appropriate: allergies, current medications, past family history, past medical history, past social history, past surgical history, and problem list.    Allergies   Allergen Reactions    Hydromorphone Itching and Unknown - Low Severity    Aygestin [Norethindrone]     Cephalosporins     Codeine Unknown - Low Severity    Penicillins Unknown - Low Severity    Prometrium [Progesterone]     Cephalexin Rash and Unknown - Low Severity    Ciprofloxacin Rash and Unknown - Low Severity    Hydrocodone Rash and Unknown - Low Severity    Nitrofurantoin Macrocrystal Rash and Unknown - Low Severity         Current Outpatient Medications:     Mirabegron ER (Myrbetriq) 50 MG tablet sustained-release 24 hour 24 hr tablet, TAKE 1 TABLET EVERY EVENING, Disp: 90 tablet, Rfl: 3    acetaminophen (TYLENOL) 650 MG 8 hr tablet, Take 1,000 mg by mouth Every 8 (Eight) Hours As Needed for Mild Pain ., Disp: , Rfl:     amLODIPine (NORVASC) 5 MG tablet, , Disp: , Rfl: 0     aspirin 81 MG chewable tablet, Chew 1 tablet Every Other Day., Disp: , Rfl:     atenolol (TENORMIN) 50 MG tablet, Take 1 tablet by mouth Daily., Disp: , Rfl:     Blood Glucose Monitoring Suppl (ACCU-CHEK LUÍS PLUS) W/DEVICE kit, , Disp: , Rfl:     estradiol (ESTRACE) 0.5 MG tablet, Take 1 tablet by mouth every other day (Patient taking differently: 0.25 mg (1/2 tablet) twice/week.), Disp: 45 tablet, Rfl: 3    gabapentin (NEURONTIN) 100 MG capsule, Take 1 capsule by mouth As Needed., Disp: , Rfl:     gabapentin (NEURONTIN) 300 MG capsule, Take 1 capsule by mouth Every Night., Disp: , Rfl:     glipizide (GLUCOTROL XL) 2.5 MG 24 hr tablet, Take 2 tablets by mouth Daily., Disp: 180 tablet, Rfl: 3    glucose blood (Accu-Chek Luís Plus) test strip, TEST BLOOD SUGAR EVERY DAY, Disp: 100 each, Rfl: 3    metFORMIN ER (GLUCOPHAGE-XR) 500 MG 24 hr tablet, Take 2 tablets by mouth 2 (Two) Times a Day., Disp: 360 tablet, Rfl: 3    Multiple Vitamins-Minerals (DAILY MULTI VITAMIN/MINERALS PO), Take  by mouth., Disp: , Rfl:     PreviDent 5000 Sensitive 1.1-5 % gel, , Disp: , Rfl:     Semaglutide,0.25 or 0.5MG/DOS, (Ozempic, 0.25 or 0.5 MG/DOSE,) 2 MG/3ML solution pen-injector, INJECT 0.5MG UNDER THE SKIN INTO THE APPROPRIATE AREA ONE TIME WEEKLY AS DIRECTED, Disp: 9 mL, Rfl: 3    Past Medical History:   Diagnosis Date    Benign essential hypertension     Carcinoma in situ of skin of chest wall     History of nephrolithiasis     Hormone replacement therapy (HRT)     Hypertension     Hypertriglyceridemia     Injury of left foot     Tendon injury left foot-Abstracted from [employer]    Inverse psoriasis     Menopause     Obesity     Personal history of kidney cancer     Primary hyperparathyroidism     Rectocele     Sciatica     Type 2 diabetes mellitus     Urine incontinence     Uterine prolapse     Vitamin D deficiency years ago        Past Surgical History:   Procedure Laterality Date    BREAST CYST ASPIRATION Left     CATARACT  "EXTRACTION Bilateral     COLONOSCOPY      COLPORRHAPHY      posterior    DILATATION AND CURETTAGE      HYSTEROSCOPY      D&C X2    LAPAROSCOPIC PARTIAL NEPHRECTOMY      Renal cell carcinoma    LIPOSUCTION      LUMBAR FUSION      OOPHORECTOMY Bilateral ~ 2012    OTHER SURGICAL HISTORY      Lithotripsy    PARATHYROIDECTOMY  06/2006    PELVIC LAPAROSCOPY      SPINAL FUSION      L3 and L4 due to sciatica    TOTAL LAPAROSCOPIC HYSTERECTOMY SALPINGO OOPHORECTOMY Bilateral ~ 2012    TUBAL ABDOMINAL LIGATION         Objective  /80   Ht 165.1 cm (65\")   Wt 100 kg (221 lb 1.6 oz)   LMP  (LMP Unknown) Comment: LMP PM ~ PRIOR TO AGE 61  Breastfeeding No   BMI 36.79 kg/m²     Physical Exam  Vitals and nursing note reviewed. Exam conducted with a chaperone present.   Constitutional:       General: She is not in acute distress.     Appearance: Normal appearance. She is not ill-appearing.   HENT:      Head: Normocephalic.   Neck:      Thyroid: No thyroid mass or thyromegaly.   Cardiovascular:      Rate and Rhythm: Normal rate and regular rhythm.      Heart sounds: Normal heart sounds. No murmur heard.  Pulmonary:      Effort: Pulmonary effort is normal. No respiratory distress.      Breath sounds: Normal breath sounds.   Chest:   Breasts:     Right: No inverted nipple, mass or nipple discharge.      Left: No inverted nipple, mass or nipple discharge.   Abdominal:      Palpations: Abdomen is soft. There is no mass.      Tenderness: There is no abdominal tenderness.   Genitourinary:     General: Normal vulva.      Labia:         Right: No rash, tenderness or lesion.         Left: No rash, tenderness or lesion.       Vagina: Prolapsed vaginal walls present. No vaginal discharge or erythema.      Uterus: Absent.       Adnexa:         Right: No mass or tenderness.          Left: No mass or tenderness.        Comments: Poor vaginal tone;  no mesh noted;  pink mucosa intact.  Anus appears wnl.  No rectal exam " performed.  Lymphadenopathy:      Upper Body:      Right upper body: No supraclavicular or axillary adenopathy.      Left upper body: No supraclavicular or axillary adenopathy.   Skin:     General: Skin is warm and dry.   Neurological:      Mental Status: She is alert and oriented to person, place, and time.   Psychiatric:         Mood and Affect: Mood normal.         Behavior: Behavior normal.         Assessment/Plan   Diagnoses and all orders for this visit:    1. Encounter for gynecological examination with abnormal finding (Primary)    2. Female genital prolapse, unspecified type    3. OAB (overactive bladder)  -     Mirabegron ER (Myrbetriq) 50 MG tablet sustained-release 24 hour 24 hr tablet; TAKE 1 TABLET EVERY EVENING  Dispense: 90 tablet; Refill: 3    4. Postmenopausal  -     DEXA Bone Density Axial; Future        Procedures    40 to 64: Counseling/Anticipatory Guidance Discussed: screenings and self-breast exam    Return in about 1 year (around 9/9/2025) for Annual physical.    Lesa Ding, CAMERON  09/09/2024

## 2024-10-18 ENCOUNTER — HOSPITAL ENCOUNTER (OUTPATIENT)
Dept: BONE DENSITY | Facility: HOSPITAL | Age: 74
Discharge: HOME | End: 2024-10-18
Payer: MEDICARE

## 2024-10-18 DIAGNOSIS — Z78.0 POSTMENOPAUSAL: ICD-10-CM

## 2024-10-18 PROCEDURE — 77080 DXA BONE DENSITY AXIAL: CPT

## 2024-11-05 RX ORDER — SODIUM, POTASSIUM,MAG SULFATES 17.5-3.13G
SOLUTION, RECONSTITUTED, ORAL ORAL
Qty: 354 ML | Refills: 0 | Status: SHIPPED | OUTPATIENT
Start: 2024-11-05

## 2024-11-20 DIAGNOSIS — E11.65 TYPE 2 DIABETES MELLITUS WITH HYPERGLYCEMIA, WITHOUT LONG-TERM CURRENT USE OF INSULIN: Chronic | ICD-10-CM

## 2024-11-20 NOTE — TELEPHONE ENCOUNTER
Rx Refill Note  Requested Prescriptions     Pending Prescriptions Disp Refills    metFORMIN ER (GLUCOPHAGE-XR) 500 MG 24 hr tablet [Pharmacy Med Name: metFORMIN HCl ER Oral Tablet Extended Release 24 Hour 500 MG] 360 tablet 3     Sig: TAKE 2 TABLETS TWICE DAILY      Last office visit with prescribing clinician: 9/4/2024   Last telemedicine visit with prescribing clinician: Visit date not found   Next office visit with prescribing clinician: 1/7/2025                         Would you like a call back once the refill request has been completed: [] Yes [] No    If the office needs to give you a call back, can they leave a voicemail: [] Yes [] No    Kiesha Alonso MA  11/20/24, 14:15 EST

## 2024-11-21 RX ORDER — METFORMIN HYDROCHLORIDE 500 MG/1
1000 TABLET, EXTENDED RELEASE ORAL 2 TIMES DAILY
Qty: 360 TABLET | Refills: 0 | Status: SHIPPED | OUTPATIENT
Start: 2024-11-21

## 2024-11-21 NOTE — TELEPHONE ENCOUNTER
Rx Refill Note  Requested Prescriptions     Pending Prescriptions Disp Refills    metFORMIN ER (GLUCOPHAGE-XR) 500 MG 24 hr tablet [Pharmacy Med Name: metFORMIN HCl ER Oral Tablet Extended Release 24 Hour 500 MG] 360 tablet 3     Sig: TAKE 2 TABLETS TWICE DAILY      Last office visit with prescribing clinician: 9/4/2024     Next office visit with prescribing clinician: 1/7/2025

## 2024-11-25 ENCOUNTER — OUTSIDE FACILITY SERVICE (OUTPATIENT)
Dept: GASTROENTEROLOGY | Facility: CLINIC | Age: 74
End: 2024-11-25
Payer: MEDICARE

## 2024-11-25 PROCEDURE — 45385 COLONOSCOPY W/LESION REMOVAL: CPT | Performed by: INTERNAL MEDICINE

## 2024-11-25 PROCEDURE — 88305 TISSUE EXAM BY PATHOLOGIST: CPT | Performed by: INTERNAL MEDICINE

## 2024-11-26 ENCOUNTER — LAB REQUISITION (OUTPATIENT)
Dept: LAB | Facility: HOSPITAL | Age: 74
End: 2024-11-26
Payer: MEDICARE

## 2024-11-26 DIAGNOSIS — Z12.11 ENCOUNTER FOR SCREENING FOR MALIGNANT NEOPLASM OF COLON: ICD-10-CM

## 2024-11-26 DIAGNOSIS — K57.30 DIVERTICULOSIS OF LARGE INTESTINE WITHOUT PERFORATION OR ABSCESS WITHOUT BLEEDING: ICD-10-CM

## 2024-11-26 DIAGNOSIS — Z86.0100 PERSONAL HISTORY OF COLON POLYPS, UNSPECIFIED: ICD-10-CM

## 2024-11-26 DIAGNOSIS — D12.2 BENIGN NEOPLASM OF ASCENDING COLON: ICD-10-CM

## 2024-11-27 LAB
CYTO UR: NORMAL
LAB AP CASE REPORT: NORMAL
LAB AP CLINICAL INFORMATION: NORMAL
PATH REPORT.FINAL DX SPEC: NORMAL
PATH REPORT.GROSS SPEC: NORMAL

## 2024-12-02 ENCOUNTER — TELEPHONE (OUTPATIENT)
Dept: ENDOCRINOLOGY | Facility: CLINIC | Age: 74
End: 2024-12-02
Payer: MEDICARE

## 2024-12-02 DIAGNOSIS — E11.65 TYPE 2 DIABETES MELLITUS WITH HYPERGLYCEMIA, WITHOUT LONG-TERM CURRENT USE OF INSULIN: Primary | Chronic | ICD-10-CM

## 2024-12-02 NOTE — TELEPHONE ENCOUNTER
Caller: Yulissa Bauer    Relationship to patient: Self    Best call back number:     [phone] (Mobile)       Patient is needing: PT IS WANTING TO KNOW IF WE CAN PUT IN ORDERS FOR AN A1C TEST, PT UNABLE TO COME IN ANYTIME IN DECEMBER FOR AN ACTUAL APPT DUE TO BEING BUSY BUT WANTS TO KNOW IF SHE CAN STILL COME IN TO DO A1C TEST    PLEASE CALL PT BACK ONCE ORDERS ARE IN

## 2024-12-03 NOTE — TELEPHONE ENCOUNTER
I can order A1C level for any [facility] lab, or we can do POC A1C here. If it is high she would need to schedule an extra appointment soon. At this time I have Dec 30 open.

## 2024-12-18 DIAGNOSIS — E11.65 TYPE 2 DIABETES MELLITUS WITH HYPERGLYCEMIA, WITHOUT LONG-TERM CURRENT USE OF INSULIN: Chronic | ICD-10-CM

## 2024-12-18 NOTE — TELEPHONE ENCOUNTER
Rx Refill Note  Requested Prescriptions     Pending Prescriptions Disp Refills    glipizide (GLUCOTROL XL) 2.5 MG 24 hr tablet [Pharmacy Med Name: glipiZIDE ER Oral Tablet Extended Release 24 Hour 2.5 MG] 180 tablet 3     Sig: TAKE 2 TABLETS EVERY DAY      Last office visit with prescribing clinician: 9/4/2024     Next office visit with prescribing clinician: 4/29/2025

## 2024-12-20 RX ORDER — GLIPIZIDE 2.5 MG/1
5 TABLET, EXTENDED RELEASE ORAL DAILY
Qty: 180 TABLET | Refills: 1 | Status: SHIPPED | OUTPATIENT
Start: 2024-12-20

## 2025-01-31 ENCOUNTER — CLINICAL SUPPORT (OUTPATIENT)
Dept: ENDOCRINOLOGY | Facility: CLINIC | Age: 75
End: 2025-01-31
Payer: MEDICARE

## 2025-01-31 VITALS — BODY MASS INDEX: 36.79 KG/M2 | HEIGHT: 65 IN

## 2025-01-31 DIAGNOSIS — E11.65 TYPE 2 DIABETES MELLITUS WITH HYPERGLYCEMIA, WITHOUT LONG-TERM CURRENT USE OF INSULIN: Primary | ICD-10-CM

## 2025-01-31 LAB
EXPIRATION DATE: ABNORMAL
HBA1C MFR BLD: 7.6 % (ref 4.5–5.7)
Lab: ABNORMAL

## 2025-01-31 PROCEDURE — 83036 HEMOGLOBIN GLYCOSYLATED A1C: CPT | Performed by: INTERNAL MEDICINE

## 2025-01-31 PROCEDURE — 3051F HG A1C>EQUAL 7.0%<8.0%: CPT | Performed by: INTERNAL MEDICINE

## 2025-02-02 DIAGNOSIS — E11.65 TYPE 2 DIABETES MELLITUS WITH HYPERGLYCEMIA, WITHOUT LONG-TERM CURRENT USE OF INSULIN: Chronic | ICD-10-CM

## 2025-02-03 NOTE — TELEPHONE ENCOUNTER
Rx Refill Note  Requested Prescriptions     Pending Prescriptions Disp Refills    metFORMIN ER (GLUCOPHAGE-XR) 500 MG 24 hr tablet [Pharmacy Med Name: metFORMIN HCl ER Oral Tablet Extended Release 24 Hour 500 MG] 360 tablet 3     Sig: TAKE 2 TABLETS TWICE DAILY      Last office visit with prescribing clinician: 9/4/2024   Last telemedicine visit with prescribing clinician: Visit date not found   Next office visit with prescribing clinician: 4/29/2025                         Would you like a call back once the refill request has been completed: [] Yes [] No    If the office needs to give you a call back, can they leave a voicemail: [] Yes [] No    Leticia Olivo MA  02/03/25, 10:04 EST

## 2025-02-04 RX ORDER — METFORMIN HYDROCHLORIDE 500 MG/1
1000 TABLET, EXTENDED RELEASE ORAL 2 TIMES DAILY
Qty: 360 TABLET | Refills: 0 | Status: SHIPPED | OUTPATIENT
Start: 2025-02-04

## 2025-02-12 LAB
EXPIRATION DATE: ABNORMAL
HBA1C MFR BLD: 7.6 % (ref 4.5–5.7)
Lab: ABNORMAL

## 2025-02-19 RX ORDER — BLOOD SUGAR DIAGNOSTIC
STRIP MISCELLANEOUS
Qty: 200 EACH | Refills: 3 | Status: SHIPPED | OUTPATIENT
Start: 2025-02-19

## 2025-02-19 NOTE — TELEPHONE ENCOUNTER
Rx Refill Note  Requested Prescriptions     Pending Prescriptions Disp Refills    glucose blood (Accu-Chek Rolanda Plus) test strip [Pharmacy Med Name: Accu-Chek Rolanda Plus In Vitro Strip]  3     Sig: TEST BLOOD SUGAR TWICE DAILY      Last office visit with prescribing clinician: 9/4/2024   Last telemedicine visit with prescribing clinician: Visit date not found   Next office visit with prescribing clinician: 4/29/2025                         Would you like a call back once the refill request has been completed: [] Yes [] No    If the office needs to give you a call back, can they leave a voicemail: [] Yes [] No    Leticia Olivo MA  02/19/25, 09:28 EST

## 2025-04-18 DIAGNOSIS — E11.65 TYPE 2 DIABETES MELLITUS WITH HYPERGLYCEMIA, WITHOUT LONG-TERM CURRENT USE OF INSULIN: Chronic | ICD-10-CM

## 2025-04-18 RX ORDER — METFORMIN HYDROCHLORIDE 500 MG/1
1000 TABLET, EXTENDED RELEASE ORAL 2 TIMES DAILY
Qty: 360 TABLET | Refills: 3 | Status: SHIPPED | OUTPATIENT
Start: 2025-04-18

## 2025-04-18 NOTE — TELEPHONE ENCOUNTER
Rx Refill Note  Requested Prescriptions     Pending Prescriptions Disp Refills    metFORMIN ER (GLUCOPHAGE-XR) 500 MG 24 hr tablet [Pharmacy Med Name: metFORMIN HCl ER Oral Tablet Extended Release 24 Hour 500 MG] 360 tablet 3     Sig: TAKE 2 TABLETS TWICE DAILY      Last office visit with prescribing clinician: 9/4/2024   Last telemedicine visit with prescribing clinician: Visit date not found   Next office visit with prescribing clinician: 4/29/2025                         Would you like a call back once the refill request has been completed: [] Yes [] No    If the office needs to give you a call back, can they leave a voicemail: [] Yes [] No    Leticia Olivo MA  04/18/25, 09:02 EDT

## 2025-04-22 NOTE — TELEPHONE ENCOUNTER
Pt called back and said she wanted the 2.5 mounjaro sent to [facility] pharmacy     She has an jaiden to see Dr Diane  on 4/29/25

## 2025-04-22 NOTE — TELEPHONE ENCOUNTER
Last office visit with prescribing clinician: 9/4/2024       Next office visit with prescribing clinician: 4/29/2025     {

## 2025-04-29 ENCOUNTER — OFFICE VISIT (OUTPATIENT)
Dept: ENDOCRINOLOGY | Facility: CLINIC | Age: 75
End: 2025-04-29
Payer: MEDICARE

## 2025-04-29 VITALS
SYSTOLIC BLOOD PRESSURE: 120 MMHG | HEART RATE: 76 BPM | BODY MASS INDEX: 35.99 KG/M2 | HEIGHT: 65 IN | DIASTOLIC BLOOD PRESSURE: 74 MMHG | WEIGHT: 216 LBS | OXYGEN SATURATION: 99 %

## 2025-04-29 DIAGNOSIS — E66.812 CLASS 2 OBESITY WITH BODY MASS INDEX (BMI) OF 35.0 TO 35.9 IN ADULT, UNSPECIFIED OBESITY TYPE, UNSPECIFIED WHETHER SERIOUS COMORBIDITY PRESENT: Chronic | ICD-10-CM

## 2025-04-29 DIAGNOSIS — E11.65 TYPE 2 DIABETES MELLITUS WITH HYPERGLYCEMIA, WITHOUT LONG-TERM CURRENT USE OF INSULIN: Primary | ICD-10-CM

## 2025-04-29 DIAGNOSIS — I10 PRIMARY HYPERTENSION: ICD-10-CM

## 2025-04-29 LAB
EXPIRATION DATE: ABNORMAL
EXPIRATION DATE: ABNORMAL
GLUCOSE BLDC GLUCOMTR-MCNC: 176 MG/DL (ref 70–130)
HBA1C MFR BLD: 7 % (ref 4.5–5.7)
Lab: ABNORMAL
Lab: ABNORMAL

## 2025-04-29 NOTE — PROGRESS NOTES
"     Office Note      Date: 2025  Patient Name: Yulissa Bauer  MRN: [id number]  : 1950    Chief Complaint   Patient presents with    Diabetes     Type 2 diabetes mellitus with hyperglycemia, without long-term current use of insulin         History of Present Illness  Yulissa Bauer is a 74 y.o. female who presents today for follow up on type 2 diabetes.   Diabetes was diagnosed in .  Known diabetic complications: peripheral neuropathy.  Current diabetic medications: Ozempic low dose, metformin ER, glipizide XL.  Past diabetic medications: Jardiance (UTIs), Januvia.  She is testing blood sugars once daily.  Fasting readings typically 100-120 range.    She is not exercising regularly.  ACE inhibitor/ARB: yes  Statin: No.    She has constipation with the Ozempic , changed to Mounjaro 2.5 mg 2 weeks ago. She doesn't have constipation but doesn't feel that it is as effective for appetite suppression.   Patient reported severe hyperglycemia after steroids. She is planning to have several injection.      Review of Systems   Gastrointestinal:  Positive for constipation.         Vitals:  Vitals:    25 0838   BP: 120/74   BP Location: Right arm   Patient Position: Sitting   Cuff Size: Adult   Pulse: 76   SpO2: 99%   Weight: 98 kg (216 lb)   Height: 165.1 cm (65\")    Physical Exam  Constitutional:       General: She is not in acute distress.  Cardiovascular:      Rate and Rhythm: Normal rate and regular rhythm.      Pulses: Normal pulses.      Heart sounds: Normal heart sounds.   Musculoskeletal:         General: No swelling.   Neurological:      Mental Status: She is alert and oriented to person, place, and time.   Psychiatric:         Mood and Affect: Affect normal.       Labs/Imaging    Hemoglobin A1c  Lab Results   Component Value Date    HGBA1C 7.0 (A) 2025    HGBA1C 7.6 (A) 2025    HGBA1C 7.6 (A) 2025     Glucose  Lab Results   Component Value Date    POCGLU 176 (A) " 04/29/2025       Current Outpatient Medications   Medication Instructions    acetaminophen (TYLENOL) 1,000 mg, Every 8 Hours PRN    amLODIPine (NORVASC) 5 MG tablet No dose, route, or frequency recorded.    aspirin 81 mg, Every Other Day    atenolol (TENORMIN) 50 MG tablet 1 tablet, Daily    Blood Glucose Monitoring Suppl (ACCU-CHEK LUÍS PLUS) W/DEVICE kit No dose, route, or frequency recorded.    estradiol (ESTRACE) 0.5 MG tablet Take 1 tablet by mouth every other day    gabapentin (NEURONTIN) 300 mg, Nightly    gabapentin (NEURONTIN) 100 mg, As Needed    glipizide (GLUCOTROL XL) 5 mg, Oral, Daily    glucose blood (Accu-Chek Luís Plus) test strip TEST BLOOD SUGAR TWICE DAILY    metFORMIN ER (GLUCOPHAGE-XR) 1,000 mg, Oral, 2 Times Daily    Mirabegron ER (Myrbetriq) 50 MG tablet sustained-release 24 hour 24 hr tablet TAKE 1 TABLET EVERY EVENING    Multiple Vitamins-Minerals (DAILY MULTI VITAMIN/MINERALS PO) Take  by mouth.    PreviDent 5000 Sensitive 1.1-5 % gel No dose, route, or frequency recorded.    sodium-potassium-magnesium sulfates (Suprep Bowel Prep Kit) 17.5-3.13-1.6 GM/177ML solution oral solution Take First Dose at 5 pm Take Second Dose at 10 pm, Nothing to eat or drink after midnight. Follow instructions provided by Office. Call [phone] with questions.    Tirzepatide 5 mg, Subcutaneous, Weekly       Assessment & Plan  Diagnoses and all orders for this visit:    1. Type 2 diabetes mellitus with hyperglycemia, without long-term current use of insulin (Primary)  -     POC Glycosylated Hemoglobin (Hb A1C)  -     POC Glucose, Blood    2. Class 2 obesity with body mass index (BMI) of 35.0 to 35.9 in adult, unspecified obesity type, unspecified whether serious comorbidity present    3. Primary hypertension    Other orders  -     Tirzepatide 5 MG/0.5ML solution auto-injector; Inject 5 mg under the skin into the appropriate area as directed 1 (One) Time Per Week.  Dispense: 2 mL; Refill: 6      Cont  Mounjaro and increase the dose to 5 mg in 1 month if tolerated.    Cont metformin and glipizide 2 tabs daily.   Reviewed management of hyperglycemia after steroids.   Increased glipizide dose was not effective - will use insulin for the high doses.   I have given a sample of 75/25 humalog insulin - take 10 units before meal 2-3 days after steroid injection    Return in about 3 months (around 7/29/2025) for 15 min appointment. She was advised to contact the office with any interval questions or concerns.    Salena Diane MD  Endocrinology  04/29/2025

## 2025-05-15 DIAGNOSIS — E11.65 TYPE 2 DIABETES MELLITUS WITH HYPERGLYCEMIA, WITHOUT LONG-TERM CURRENT USE OF INSULIN: Chronic | ICD-10-CM

## 2025-05-15 RX ORDER — GLIPIZIDE 2.5 MG/1
5 TABLET, EXTENDED RELEASE ORAL DAILY
Qty: 180 TABLET | Refills: 3 | Status: SHIPPED | OUTPATIENT
Start: 2025-05-15

## 2025-05-15 NOTE — TELEPHONE ENCOUNTER
Rx Refill Note  Requested Prescriptions     Pending Prescriptions Disp Refills    glipizide (GLUCOTROL XL) 2.5 MG 24 hr tablet [Pharmacy Med Name: glipiZIDE ER Oral Tablet Extended Release 24 Hour 2.5 MG] 180 tablet 3     Sig: TAKE 2 TABLETS EVERY DAY      Last office visit with prescribing clinician: 4/29/2025   Last telemedicine visit with prescribing clinician: Visit date not found   Next office visit with prescribing clinician: 8/20/2025                         Would you like a call back once the refill request has been completed: [] Yes [] No    If the office needs to give you a call back, can they leave a voicemail: [] Yes [] No    Leticia Olivo MA  05/15/25, 09:21 EDT

## 2025-07-28 ENCOUNTER — TRANSCRIBE ORDERS (OUTPATIENT)
Dept: ADMINISTRATIVE | Facility: HOSPITAL | Age: 75
End: 2025-07-28
Payer: MEDICARE

## 2025-07-28 DIAGNOSIS — Z12.31 VISIT FOR SCREENING MAMMOGRAM: Primary | ICD-10-CM

## 2025-08-20 ENCOUNTER — OFFICE VISIT (OUTPATIENT)
Dept: ENDOCRINOLOGY | Facility: CLINIC | Age: 75
End: 2025-08-20
Payer: MEDICARE

## 2025-08-20 VITALS
HEART RATE: 81 BPM | HEIGHT: 65 IN | OXYGEN SATURATION: 98 % | SYSTOLIC BLOOD PRESSURE: 136 MMHG | DIASTOLIC BLOOD PRESSURE: 71 MMHG | BODY MASS INDEX: 34.99 KG/M2 | WEIGHT: 210 LBS

## 2025-08-20 DIAGNOSIS — I10 PRIMARY HYPERTENSION: ICD-10-CM

## 2025-08-20 DIAGNOSIS — E11.65 TYPE 2 DIABETES MELLITUS WITH HYPERGLYCEMIA, WITHOUT LONG-TERM CURRENT USE OF INSULIN: Primary | ICD-10-CM

## 2025-08-20 LAB
EXPIRATION DATE: ABNORMAL
EXPIRATION DATE: ABNORMAL
GLUCOSE BLDC GLUCOMTR-MCNC: 150 MG/DL (ref 70–130)
HBA1C MFR BLD: 6.6 % (ref 4.5–5.7)
Lab: ABNORMAL
Lab: ABNORMAL